# Patient Record
Sex: FEMALE | Race: WHITE | NOT HISPANIC OR LATINO | Employment: FULL TIME | URBAN - METROPOLITAN AREA
[De-identification: names, ages, dates, MRNs, and addresses within clinical notes are randomized per-mention and may not be internally consistent; named-entity substitution may affect disease eponyms.]

---

## 2015-06-16 LAB — CRC RECOMMENDATION EXT: NORMAL

## 2020-07-08 ENCOUNTER — HISTORICAL (OUTPATIENT)
Dept: RADIOLOGY | Facility: HOSPITAL | Age: 60
End: 2020-07-08

## 2020-08-11 ENCOUNTER — HISTORICAL (OUTPATIENT)
Dept: LAB | Facility: HOSPITAL | Age: 60
End: 2020-08-11

## 2020-08-11 LAB
ABS NEUT (OLG): 4 X10(3)/MCL (ref 2.1–9.2)
ALBUMIN SERPL-MCNC: 4 GM/DL (ref 3.4–5)
ALBUMIN/GLOB SERPL: 1.1 RATIO (ref 1.1–2)
ALP SERPL-CCNC: 30 UNIT/L (ref 46–116)
ALT SERPL-CCNC: 24 UNIT/L (ref 12–78)
AST SERPL-CCNC: 23 UNIT/L (ref 15–37)
BASOPHILS # BLD AUTO: 0 X10(3)/MCL (ref 0–0.2)
BASOPHILS NFR BLD AUTO: 1 %
BILIRUB SERPL-MCNC: 0.4 MG/DL (ref 0.2–1)
BILIRUBIN DIRECT+TOT PNL SERPL-MCNC: 0.08 MG/DL (ref 0–0.2)
BILIRUBIN DIRECT+TOT PNL SERPL-MCNC: 0.32 MG/DL (ref 0–0.8)
BUN SERPL-MCNC: 25.6 MG/DL (ref 7–18)
CALCIUM SERPL-MCNC: 9.6 MG/DL (ref 8.5–10.1)
CHLORIDE SERPL-SCNC: 107 MMOL/L (ref 98–107)
CHOLEST SERPL-MCNC: 178 MG/DL (ref 0–200)
CHOLEST/HDLC SERPL: 4.1 {RATIO} (ref 0–4)
CO2 SERPL-SCNC: 31.4 MMOL/L (ref 21–32)
CREAT SERPL-MCNC: 0.86 MG/DL (ref 0.6–1.3)
EOSINOPHIL # BLD AUTO: 0.3 X10(3)/MCL (ref 0–0.9)
EOSINOPHIL NFR BLD AUTO: 4 %
ERYTHROCYTE [DISTWIDTH] IN BLOOD BY AUTOMATED COUNT: 13.4 % (ref 11.5–17)
EST. AVERAGE GLUCOSE BLD GHB EST-MCNC: 103 MG/DL
GLOBULIN SER-MCNC: 3.8 GM/DL (ref 2.4–3.5)
GLUCOSE SERPL-MCNC: 87 MG/DL (ref 74–106)
HBA1C MFR BLD: 5.2 % (ref 4.5–6.2)
HCT VFR BLD AUTO: 43.2 % (ref 37–47)
HDLC SERPL-MCNC: 43 MG/DL (ref 40–60)
HGB BLD-MCNC: 14 GM/DL (ref 12–16)
IMM GRANULOCYTES # BLD AUTO: 0.01 % (ref 0–0.02)
IMM GRANULOCYTES NFR BLD AUTO: 0.2 % (ref 0–0.43)
LDLC SERPL CALC-MCNC: 124 MG/DL (ref 0–129)
LYMPHOCYTES # BLD AUTO: 1.5 X10(3)/MCL (ref 0.6–4.6)
LYMPHOCYTES NFR BLD AUTO: 24 %
MCH RBC QN AUTO: 30.4 PG (ref 27–31)
MCHC RBC AUTO-ENTMCNC: 32.4 GM/DL (ref 33–36)
MCV RBC AUTO: 93.9 FL (ref 80–94)
MONOCYTES # BLD AUTO: 0.6 X10(3)/MCL (ref 0.1–1.3)
MONOCYTES NFR BLD AUTO: 10 %
NEUTROPHILS # BLD AUTO: 4 X10(3)/MCL (ref 1.4–7.9)
NEUTROPHILS NFR BLD AUTO: 62 %
PLATELET # BLD AUTO: 229 X10(3)/MCL (ref 130–400)
PMV BLD AUTO: 11.8 FL (ref 9.4–12.4)
POTASSIUM SERPL-SCNC: 4.7 MMOL/L (ref 3.5–5.1)
PROT SERPL-MCNC: 7.8 GM/DL (ref 6.4–8.2)
RBC # BLD AUTO: 4.6 X10(6)/MCL (ref 4.2–5.4)
SODIUM SERPL-SCNC: 145 MMOL/L (ref 136–145)
TRIGL SERPL-MCNC: 54 MG/DL
TSH SERPL-ACNC: 1.61 MIU/ML (ref 0.36–3.74)
VLDLC SERPL CALC-MCNC: 11 MG/DL
WBC # SPEC AUTO: 6.5 X10(3)/MCL (ref 4.5–11.5)

## 2021-01-18 LAB — CRC RECOMMENDATION EXT: NORMAL

## 2021-01-22 LAB
BCS RECOMMENDATION EXT: NORMAL
BCS RECOMMENDATION EXT: NORMAL

## 2021-03-17 ENCOUNTER — HISTORICAL (OUTPATIENT)
Dept: LAB | Facility: HOSPITAL | Age: 61
End: 2021-03-17

## 2021-03-17 LAB
ABS NEUT (OLG): 2.18 X10(3)/MCL (ref 2.1–9.2)
ALBUMIN SERPL-MCNC: 3.8 GM/DL (ref 3.4–4.8)
ALBUMIN/GLOB SERPL: 1.5 RATIO (ref 1.1–2)
ALP SERPL-CCNC: 35 UNIT/L (ref 40–150)
ALT SERPL-CCNC: 15 UNIT/L (ref 0–55)
AST SERPL-CCNC: 22 UNIT/L (ref 5–34)
BASOPHILS # BLD AUTO: 0 X10(3)/MCL (ref 0–0.2)
BASOPHILS NFR BLD AUTO: 1 %
BILIRUB SERPL-MCNC: 0.6 MG/DL
BILIRUBIN DIRECT+TOT PNL SERPL-MCNC: 0.2 MG/DL (ref 0–0.5)
BILIRUBIN DIRECT+TOT PNL SERPL-MCNC: 0.4 MG/DL (ref 0–0.8)
BUN SERPL-MCNC: 19.9 MG/DL (ref 9.8–20.1)
CALCIUM SERPL-MCNC: 8.7 MG/DL (ref 8.4–10.2)
CHLORIDE SERPL-SCNC: 105 MMOL/L (ref 98–107)
CHOLEST SERPL-MCNC: 165 MG/DL
CHOLEST/HDLC SERPL: 4 {RATIO} (ref 0–5)
CO2 SERPL-SCNC: 27 MMOL/L (ref 23–31)
CREAT SERPL-MCNC: 0.72 MG/DL (ref 0.55–1.02)
DEPRECATED CALCIDIOL+CALCIFEROL SERPL-MC: 59.8 NG/ML (ref 30–80)
EOSINOPHIL # BLD AUTO: 0.3 X10(3)/MCL (ref 0–0.9)
EOSINOPHIL NFR BLD AUTO: 8 %
ERYTHROCYTE [DISTWIDTH] IN BLOOD BY AUTOMATED COUNT: 13.1 % (ref 11.5–17)
GLOBULIN SER-MCNC: 2.6 GM/DL (ref 2.4–3.5)
GLUCOSE SERPL-MCNC: 77 MG/DL (ref 82–115)
HCT VFR BLD AUTO: 45.1 % (ref 37–47)
HDLC SERPL-MCNC: 38 MG/DL (ref 35–60)
HGB BLD-MCNC: 13.7 GM/DL (ref 12–16)
LDLC SERPL CALC-MCNC: 113 MG/DL (ref 50–140)
LYMPHOCYTES # BLD AUTO: 1.4 X10(3)/MCL (ref 0.6–4.6)
LYMPHOCYTES NFR BLD AUTO: 32 %
MCH RBC QN AUTO: 30.2 PG (ref 27–31)
MCHC RBC AUTO-ENTMCNC: 30.4 GM/DL (ref 33–36)
MCV RBC AUTO: 99.3 FL (ref 80–94)
MONOCYTES # BLD AUTO: 0.4 X10(3)/MCL (ref 0.1–1.3)
MONOCYTES NFR BLD AUTO: 10 %
NEUTROPHILS # BLD AUTO: 2.18 X10(3)/MCL (ref 1.4–7.9)
NEUTROPHILS NFR BLD AUTO: 50 %
PLATELET # BLD AUTO: 227 X10(3)/MCL (ref 130–400)
PMV BLD AUTO: 11.3 FL (ref 9.4–12.4)
POTASSIUM SERPL-SCNC: 4.7 MMOL/L (ref 3.5–5.1)
PROT SERPL-MCNC: 6.4 GM/DL (ref 5.8–7.6)
RBC # BLD AUTO: 4.54 X10(6)/MCL (ref 4.2–5.4)
SODIUM SERPL-SCNC: 141 MMOL/L (ref 136–145)
TRIGL SERPL-MCNC: 69 MG/DL (ref 37–140)
VLDLC SERPL CALC-MCNC: 14 MG/DL
WBC # SPEC AUTO: 4.4 X10(3)/MCL (ref 4.5–11.5)

## 2021-09-16 ENCOUNTER — HISTORICAL (OUTPATIENT)
Dept: LAB | Facility: HOSPITAL | Age: 61
End: 2021-09-16

## 2022-04-07 ENCOUNTER — HISTORICAL (OUTPATIENT)
Dept: ADMINISTRATIVE | Facility: HOSPITAL | Age: 62
End: 2022-04-07

## 2022-04-12 LAB
PAP RECOMMENDATION EXT: NORMAL
PAP SMEAR: NORMAL

## 2022-04-23 VITALS
WEIGHT: 135.56 LBS | SYSTOLIC BLOOD PRESSURE: 132 MMHG | DIASTOLIC BLOOD PRESSURE: 86 MMHG | OXYGEN SATURATION: 98 % | HEIGHT: 66 IN | BODY MASS INDEX: 21.79 KG/M2

## 2022-04-29 LAB — BCS RECOMMENDATION EXT: NORMAL

## 2022-06-03 ENCOUNTER — LAB VISIT (OUTPATIENT)
Dept: LAB | Facility: HOSPITAL | Age: 62
End: 2022-06-03
Attending: INTERNAL MEDICINE
Payer: COMMERCIAL

## 2022-06-03 DIAGNOSIS — M81.0 SENILE OSTEOPOROSIS: ICD-10-CM

## 2022-06-03 DIAGNOSIS — Z79.899 ENCOUNTER FOR LONG-TERM (CURRENT) USE OF OTHER MEDICATIONS: Primary | ICD-10-CM

## 2022-06-03 LAB
ALBUMIN SERPL-MCNC: 4 GM/DL (ref 3.4–4.8)
ALBUMIN/GLOB SERPL: 1.3 RATIO (ref 1.1–2)
ALP SERPL-CCNC: 33 UNIT/L (ref 40–150)
ALT SERPL-CCNC: 18 UNIT/L (ref 0–55)
AST SERPL-CCNC: 25 UNIT/L (ref 5–34)
BASOPHILS # BLD AUTO: 0.05 X10(3)/MCL (ref 0–0.2)
BASOPHILS NFR BLD AUTO: 1.2 %
BILIRUBIN DIRECT+TOT PNL SERPL-MCNC: 0.6 MG/DL
BUN SERPL-MCNC: 20.2 MG/DL (ref 9.8–20.1)
CALCIUM SERPL-MCNC: 9.7 MG/DL (ref 8.4–10.2)
CHLORIDE SERPL-SCNC: 104 MMOL/L (ref 98–107)
CO2 SERPL-SCNC: 31 MMOL/L (ref 23–31)
CREAT SERPL-MCNC: 0.79 MG/DL (ref 0.55–1.02)
EOSINOPHIL # BLD AUTO: 0.16 X10(3)/MCL (ref 0–0.9)
EOSINOPHIL NFR BLD AUTO: 3.7 %
ERYTHROCYTE [DISTWIDTH] IN BLOOD BY AUTOMATED COUNT: 13.7 % (ref 11.5–17)
GLOBULIN SER-MCNC: 3.2 GM/DL (ref 2.4–3.5)
GLUCOSE SERPL-MCNC: 91 MG/DL (ref 82–115)
HCT VFR BLD AUTO: 42.9 % (ref 37–47)
HGB BLD-MCNC: 13.8 GM/DL (ref 12–16)
IMM GRANULOCYTES # BLD AUTO: 0 X10(3)/MCL (ref 0–0.02)
IMM GRANULOCYTES NFR BLD AUTO: 0 % (ref 0–0.43)
LYMPHOCYTES # BLD AUTO: 1.14 X10(3)/MCL (ref 0.6–4.6)
LYMPHOCYTES NFR BLD AUTO: 26.5 %
MCH RBC QN AUTO: 30.2 PG (ref 27–31)
MCHC RBC AUTO-ENTMCNC: 32.2 MG/DL (ref 33–36)
MCV RBC AUTO: 93.9 FL (ref 80–94)
MONOCYTES # BLD AUTO: 0.47 X10(3)/MCL (ref 0.1–1.3)
MONOCYTES NFR BLD AUTO: 10.9 %
NEUTROPHILS # BLD AUTO: 2.5 X10(3)/MCL (ref 2.1–9.2)
NEUTROPHILS NFR BLD AUTO: 57.7 %
PLATELET # BLD AUTO: 236 X10(3)/MCL (ref 130–400)
PMV BLD AUTO: 10.4 FL (ref 9.4–12.4)
POTASSIUM SERPL-SCNC: 4.5 MMOL/L (ref 3.5–5.1)
PROT SERPL-MCNC: 7.2 GM/DL (ref 5.8–7.6)
RBC # BLD AUTO: 4.57 X10(6)/MCL (ref 4.2–5.4)
SODIUM SERPL-SCNC: 142 MMOL/L (ref 136–145)
WBC # SPEC AUTO: 4.3 X10(3)/MCL (ref 4.5–11.5)

## 2022-06-03 PROCEDURE — 80053 COMPREHEN METABOLIC PANEL: CPT

## 2022-06-03 PROCEDURE — 36415 COLL VENOUS BLD VENIPUNCTURE: CPT

## 2022-06-03 PROCEDURE — 85025 COMPLETE CBC W/AUTO DIFF WBC: CPT

## 2022-09-20 ENCOUNTER — TELEPHONE (OUTPATIENT)
Dept: FAMILY MEDICINE | Facility: CLINIC | Age: 62
End: 2022-09-20
Payer: COMMERCIAL

## 2022-09-20 ENCOUNTER — LAB VISIT (OUTPATIENT)
Dept: LAB | Facility: HOSPITAL | Age: 62
End: 2022-09-20
Attending: NURSE PRACTITIONER
Payer: COMMERCIAL

## 2022-09-20 DIAGNOSIS — Z00.00 ROUTINE GENERAL MEDICAL EXAMINATION AT A HEALTH CARE FACILITY: ICD-10-CM

## 2022-09-20 DIAGNOSIS — Z11.59 NEED FOR HEPATITIS C SCREENING TEST: ICD-10-CM

## 2022-09-20 DIAGNOSIS — Z00.00 ANNUAL PHYSICAL EXAM: ICD-10-CM

## 2022-09-20 DIAGNOSIS — Z00.00 ANNUAL PHYSICAL EXAM: Primary | ICD-10-CM

## 2022-09-20 DIAGNOSIS — M81.0 SENILE OSTEOPOROSIS: Primary | ICD-10-CM

## 2022-09-20 DIAGNOSIS — Z11.4 SCREENING FOR HIV (HUMAN IMMUNODEFICIENCY VIRUS): ICD-10-CM

## 2022-09-20 LAB
ALBUMIN SERPL-MCNC: 4 GM/DL (ref 3.4–4.8)
ALBUMIN/GLOB SERPL: 1.1 RATIO (ref 1.1–2)
ALP SERPL-CCNC: 34 UNIT/L (ref 40–150)
ALT SERPL-CCNC: 18 UNIT/L (ref 0–55)
AST SERPL-CCNC: 26 UNIT/L (ref 5–34)
BASOPHILS # BLD AUTO: 0.03 X10(3)/MCL (ref 0–0.2)
BASOPHILS NFR BLD AUTO: 0.7 %
BILIRUBIN DIRECT+TOT PNL SERPL-MCNC: 0.5 MG/DL
BUN SERPL-MCNC: 19.8 MG/DL (ref 9.8–20.1)
CALCIUM SERPL-MCNC: 9.5 MG/DL (ref 8.4–10.2)
CHLORIDE SERPL-SCNC: 102 MMOL/L (ref 98–107)
CHOLEST SERPL-MCNC: 163 MG/DL
CHOLEST/HDLC SERPL: 4 {RATIO} (ref 0–5)
CO2 SERPL-SCNC: 29 MMOL/L (ref 23–31)
CREAT SERPL-MCNC: 0.77 MG/DL (ref 0.55–1.02)
DEPRECATED CALCIDIOL+CALCIFEROL SERPL-MC: 53.4 NG/ML (ref 30–80)
EOSINOPHIL # BLD AUTO: 0.25 X10(3)/MCL (ref 0–0.9)
EOSINOPHIL NFR BLD AUTO: 5.8 %
ERYTHROCYTE [DISTWIDTH] IN BLOOD BY AUTOMATED COUNT: 13.5 % (ref 11.5–17)
GFR SERPLBLD CREATININE-BSD FMLA CKD-EPI: >60 MLS/MIN/1.73/M2
GLOBULIN SER-MCNC: 3.5 GM/DL (ref 2.4–3.5)
GLUCOSE SERPL-MCNC: 97 MG/DL (ref 82–115)
HCT VFR BLD AUTO: 45.8 % (ref 37–47)
HCV AB SERPL QL IA: NONREACTIVE
HDLC SERPL-MCNC: 40 MG/DL (ref 35–60)
HGB BLD-MCNC: 14.2 GM/DL (ref 12–16)
HIV 1+2 AB+HIV1 P24 AG SERPL QL IA: NONREACTIVE
IMM GRANULOCYTES # BLD AUTO: 0.01 X10(3)/MCL (ref 0–0.04)
IMM GRANULOCYTES NFR BLD AUTO: 0.2 %
LDLC SERPL CALC-MCNC: 109 MG/DL (ref 50–140)
LYMPHOCYTES # BLD AUTO: 1.28 X10(3)/MCL (ref 0.6–4.6)
LYMPHOCYTES NFR BLD AUTO: 29.8 %
MCH RBC QN AUTO: 29.2 PG (ref 27–31)
MCHC RBC AUTO-ENTMCNC: 31 MG/DL (ref 33–36)
MCV RBC AUTO: 94 FL (ref 80–94)
MONOCYTES # BLD AUTO: 0.42 X10(3)/MCL (ref 0.1–1.3)
MONOCYTES NFR BLD AUTO: 9.8 %
NEUTROPHILS # BLD AUTO: 2.3 X10(3)/MCL (ref 2.1–9.2)
NEUTROPHILS NFR BLD AUTO: 53.7 %
PLATELET # BLD AUTO: 216 X10(3)/MCL (ref 130–400)
PMV BLD AUTO: 10.1 FL (ref 7.4–10.4)
POTASSIUM SERPL-SCNC: 4.5 MMOL/L (ref 3.5–5.1)
PROT SERPL-MCNC: 7.5 GM/DL (ref 5.8–7.6)
RBC # BLD AUTO: 4.87 X10(6)/MCL (ref 4.2–5.4)
SODIUM SERPL-SCNC: 140 MMOL/L (ref 136–145)
TRIGL SERPL-MCNC: 71 MG/DL (ref 37–140)
TSH SERPL-ACNC: 1.95 UIU/ML (ref 0.35–4.94)
VLDLC SERPL CALC-MCNC: 14 MG/DL
WBC # SPEC AUTO: 4.3 X10(3)/MCL (ref 4.5–11.5)

## 2022-09-20 PROCEDURE — 87389 HIV-1 AG W/HIV-1&-2 AB AG IA: CPT

## 2022-09-20 PROCEDURE — 84443 ASSAY THYROID STIM HORMONE: CPT

## 2022-09-20 PROCEDURE — 85025 COMPLETE CBC W/AUTO DIFF WBC: CPT

## 2022-09-20 PROCEDURE — 80061 LIPID PANEL: CPT

## 2022-09-20 PROCEDURE — 36415 COLL VENOUS BLD VENIPUNCTURE: CPT

## 2022-09-20 PROCEDURE — 86803 HEPATITIS C AB TEST: CPT

## 2022-09-20 PROCEDURE — 82306 VITAMIN D 25 HYDROXY: CPT

## 2022-09-20 PROCEDURE — 80053 COMPREHEN METABOLIC PANEL: CPT

## 2022-09-20 NOTE — TELEPHONE ENCOUNTER
The lab most of used orders from the old system, for some reason they did not run the lipid panel.  Also, I added hep C and HIV screening.

## 2022-09-20 NOTE — TELEPHONE ENCOUNTER
Are there any outstanding tasks in patient's chart?  n    2. Do we have outstanding/pending referrals?  n    3. Has the patient been seen in an ER, Urgent Care, or admitted since last visit?  n    4. Has patient seen any other health care providers since last visit?  n    5.  Has patient had any blood work or x-rays done since last visit?   Completed labs at [facility] on 9/20/22

## 2022-09-27 ENCOUNTER — DOCUMENTATION ONLY (OUTPATIENT)
Dept: FAMILY MEDICINE | Facility: CLINIC | Age: 62
End: 2022-09-27

## 2022-09-27 ENCOUNTER — OFFICE VISIT (OUTPATIENT)
Dept: FAMILY MEDICINE | Facility: CLINIC | Age: 62
End: 2022-09-27
Payer: COMMERCIAL

## 2022-09-27 VITALS
RESPIRATION RATE: 16 BRPM | HEIGHT: 66 IN | HEART RATE: 68 BPM | WEIGHT: 132.19 LBS | SYSTOLIC BLOOD PRESSURE: 110 MMHG | BODY MASS INDEX: 21.24 KG/M2 | OXYGEN SATURATION: 98 % | TEMPERATURE: 98 F | DIASTOLIC BLOOD PRESSURE: 78 MMHG

## 2022-09-27 DIAGNOSIS — M81.0 OSTEOPOROSIS, UNSPECIFIED OSTEOPOROSIS TYPE, UNSPECIFIED PATHOLOGICAL FRACTURE PRESENCE: ICD-10-CM

## 2022-09-27 DIAGNOSIS — Z11.4 SCREENING FOR HIV (HUMAN IMMUNODEFICIENCY VIRUS): ICD-10-CM

## 2022-09-27 DIAGNOSIS — Z11.59 NEED FOR HEPATITIS C SCREENING TEST: ICD-10-CM

## 2022-09-27 DIAGNOSIS — M50.10 CERVICAL RADICULOPATHY DUE TO INTERVERTEBRAL DISC DISORDER: ICD-10-CM

## 2022-09-27 DIAGNOSIS — Z12.31 BREAST CANCER SCREENING BY MAMMOGRAM: ICD-10-CM

## 2022-09-27 DIAGNOSIS — G56.01 CARPAL TUNNEL SYNDROME OF RIGHT WRIST: ICD-10-CM

## 2022-09-27 DIAGNOSIS — F32.A ANXIETY AND DEPRESSION: ICD-10-CM

## 2022-09-27 DIAGNOSIS — B35.1 ONYCHOMYCOSIS: ICD-10-CM

## 2022-09-27 DIAGNOSIS — I10 HYPERTENSION, UNSPECIFIED TYPE: ICD-10-CM

## 2022-09-27 DIAGNOSIS — E78.5 HYPERLIPIDEMIA, UNSPECIFIED HYPERLIPIDEMIA TYPE: ICD-10-CM

## 2022-09-27 DIAGNOSIS — F41.9 ANXIETY AND DEPRESSION: ICD-10-CM

## 2022-09-27 DIAGNOSIS — M79.672 PAIN OF LEFT HEEL: ICD-10-CM

## 2022-09-27 DIAGNOSIS — Z00.00 ANNUAL PHYSICAL EXAM: Primary | ICD-10-CM

## 2022-09-27 PROBLEM — M50.90 CERVICAL DISC DISEASE: Status: ACTIVE | Noted: 2022-09-27

## 2022-09-27 PROBLEM — M50.90 CERVICAL DISC DISEASE: Status: RESOLVED | Noted: 2022-09-27 | Resolved: 2022-09-27

## 2022-09-27 PROBLEM — Z87.410 HISTORY OF CERVICAL DYSPLASIA: Status: ACTIVE | Noted: 2022-09-27

## 2022-09-27 PROBLEM — N95.2 ATROPHIC VAGINITIS: Status: ACTIVE | Noted: 2022-09-27

## 2022-09-27 PROBLEM — R42 VERTIGO: Status: ACTIVE | Noted: 2022-09-27

## 2022-09-27 PROBLEM — K63.5 POLYP OF COLON: Status: ACTIVE | Noted: 2022-09-27

## 2022-09-27 PROCEDURE — 3008F BODY MASS INDEX DOCD: CPT | Mod: CPTII,,, | Performed by: NURSE PRACTITIONER

## 2022-09-27 PROCEDURE — 96372 THER/PROPH/DIAG INJ SC/IM: CPT | Mod: ,,, | Performed by: NURSE PRACTITIONER

## 2022-09-27 PROCEDURE — 3078F PR MOST RECENT DIASTOLIC BLOOD PRESSURE < 80 MM HG: ICD-10-PCS | Mod: CPTII,,, | Performed by: NURSE PRACTITIONER

## 2022-09-27 PROCEDURE — 3074F PR MOST RECENT SYSTOLIC BLOOD PRESSURE < 130 MM HG: ICD-10-PCS | Mod: CPTII,,, | Performed by: NURSE PRACTITIONER

## 2022-09-27 PROCEDURE — 1160F PR REVIEW ALL MEDS BY PRESCRIBER/CLIN PHARMACIST DOCUMENTED: ICD-10-PCS | Mod: CPTII,,, | Performed by: NURSE PRACTITIONER

## 2022-09-27 PROCEDURE — 3008F PR BODY MASS INDEX (BMI) DOCUMENTED: ICD-10-PCS | Mod: CPTII,,, | Performed by: NURSE PRACTITIONER

## 2022-09-27 PROCEDURE — 99396 PREV VISIT EST AGE 40-64: CPT | Mod: 25,,, | Performed by: NURSE PRACTITIONER

## 2022-09-27 PROCEDURE — 99396 PR PREVENTIVE VISIT,EST,40-64: ICD-10-PCS | Mod: 25,,, | Performed by: NURSE PRACTITIONER

## 2022-09-27 PROCEDURE — 3078F DIAST BP <80 MM HG: CPT | Mod: CPTII,,, | Performed by: NURSE PRACTITIONER

## 2022-09-27 PROCEDURE — 3074F SYST BP LT 130 MM HG: CPT | Mod: CPTII,,, | Performed by: NURSE PRACTITIONER

## 2022-09-27 PROCEDURE — 96372 PR INJECTION,THERAP/PROPH/DIAG2ST, IM OR SUBCUT: ICD-10-PCS | Mod: ,,, | Performed by: NURSE PRACTITIONER

## 2022-09-27 PROCEDURE — 1159F MED LIST DOCD IN RCRD: CPT | Mod: CPTII,,, | Performed by: NURSE PRACTITIONER

## 2022-09-27 PROCEDURE — 1159F PR MEDICATION LIST DOCUMENTED IN MEDICAL RECORD: ICD-10-PCS | Mod: CPTII,,, | Performed by: NURSE PRACTITIONER

## 2022-09-27 PROCEDURE — 1160F RVW MEDS BY RX/DR IN RCRD: CPT | Mod: CPTII,,, | Performed by: NURSE PRACTITIONER

## 2022-09-27 RX ORDER — PRAVASTATIN SODIUM 40 MG/1
40 TABLET ORAL DAILY
COMMUNITY
Start: 2022-09-05 | End: 2022-12-02

## 2022-09-27 RX ORDER — MONTELUKAST SODIUM 10 MG/1
TABLET ORAL
COMMUNITY
Start: 2022-07-11 | End: 2022-10-17

## 2022-09-27 RX ORDER — CLINDAMYCIN PHOSPHATE 100 MG/5G
CREAM VAGINAL
COMMUNITY
Start: 2022-04-21 | End: 2023-04-13 | Stop reason: ALTCHOICE

## 2022-09-27 RX ORDER — FLUTICASONE PROPIONATE 50 MCG
2 SPRAY, SUSPENSION (ML) NASAL DAILY
COMMUNITY
Start: 2022-09-04 | End: 2022-12-05

## 2022-09-27 RX ORDER — ONDANSETRON 4 MG/1
TABLET, FILM COATED ORAL
COMMUNITY

## 2022-09-27 RX ORDER — ESTRADIOL 0.1 MG/G
CREAM VAGINAL
COMMUNITY
Start: 2022-07-11 | End: 2024-01-02

## 2022-09-27 RX ORDER — ALPRAZOLAM 0.25 MG/1
0.25 TABLET ORAL DAILY PRN
Qty: 30 TABLET | Refills: 2 | Status: SHIPPED | OUTPATIENT
Start: 2022-09-27 | End: 2023-10-24 | Stop reason: SDUPTHER

## 2022-09-27 RX ORDER — ESTRADIOL 10 UG/1
INSERT VAGINAL
COMMUNITY
Start: 2022-07-18 | End: 2024-01-02

## 2022-09-27 RX ORDER — HYDROCHLOROTHIAZIDE 12.5 MG/1
12.5 CAPSULE ORAL DAILY
COMMUNITY
Start: 2022-09-04 | End: 2022-12-02

## 2022-09-27 RX ORDER — DEXAMETHASONE SODIUM PHOSPHATE 100 MG/10ML
10 INJECTION INTRAMUSCULAR; INTRAVENOUS
Status: COMPLETED | OUTPATIENT
Start: 2022-09-27 | End: 2022-09-27

## 2022-09-27 RX ORDER — ALENDRONATE SODIUM 70 MG/1
TABLET ORAL
COMMUNITY
Start: 2022-09-23

## 2022-09-27 RX ADMIN — DEXAMETHASONE SODIUM PHOSPHATE 10 MG: 100 INJECTION INTRAMUSCULAR; INTRAVENOUS at 10:09

## 2022-09-27 NOTE — LETTER
AUTHORIZATION FOR RELEASE OF   CONFIDENTIAL INFORMATION    Dear [facility],    We are seeing Crystal Chaney, date of birth 1960, in the clinic at [facility]. RENETTA Estevez is the patient's PCP. Crystal Chaney has an outstanding lab/procedure at the time we reviewed her chart. In order to help keep her health information updated, she has authorized us to request the following medical record(s):        (  )  MAMMOGRAM                                      (X)  COLONOSCOPY      (  )  PAP SMEAR                                          (  )  OUTSIDE LAB RESULTS     (  )  DEXA SCAN                                          (  )  EYE EXAM            (  )  FOOT EXAM                                          (  )  ENTIRE RECORD     (  )  OUTSIDE IMMUNIZATIONS                 (  )  _______________         Please fax records to [facility], Kathryn F Duplantis, FNP, [phone]     If you have any questions, please contact us at [phone].        Patient Name: Crystal Chaney  : 1960  Patient Phone #: [phone]

## 2022-09-27 NOTE — PROGRESS NOTES
Subjective:       Patient ID: Crystal Chaney is a 61 y.o. female.    Chief Complaint: Annual Exam      HPI   This is a 61-year-old white female who presents to clinic today for an annual wellness exam.  Patient has a history of vertigo, cervical dysplasia, hyperlipidemia, hypertension, osteoarthritis, osteoporosis, colon polyps, cervical disc disease.  Patient states that overall she is doing okay.  Does complain of some pain in her right wrist and some numbness in her right 1st 3 fingers.  This has been going on now for couple of months.  Also complaining of pain in her neck.  The pain in her neck radiates down her right arm and will sometimes make her right 4th and 5th fingers numb, they are not numb at the same time as her other fingers.  Does report a history of cervical disc disease and injections in her neck while living in [place] a few years ago.  Also concerned about her weight, states she has gained a couple of lb this year.  She is exercising regularly and eating a well-balanced diet.  Also having a little bit of anxiety and maybe some depression.  States that she is taking on a lot at work right now and he had makes her feel a little bit overwhelmed.  In the past she was treated for anxiety and depression during her divorce but only was on medication for about a year and a half.  Does not feel as bad as she felt then, just a little overwhelmed and anxious.  Denies any suicidal or homicidal ideations.  Also having some pain in her left heel, mainly when she gets out of bed in the morning.  Describes it as a pain right in the center of her heel.  Denies any injury.  Patient also has a complaint of thick discolored right thumb nail as well as bilateral 2nd toe nails.  Has been using over-the-counter antifungal with no relief.    Review of Systems  Comprehensive review of systems negative except as stated in HPI    The patient's Health Maintenance was reviewed and the following appears to be due:    Health Maintenance Due   Topic Date Due    TETANUS VACCINE  Never done    COVID-19 Vaccine (4 - Booster for Ankit series) 2022    Influenza Vaccine (1) 2022       Past Medical History:  Past Medical History:   Diagnosis Date    Benign paroxysmal vertigo, bilateral     History of cervical dysplasia     Hyperlipidemia     Hypertensive disorder     Osteoarthritis of left knee     Osteoporosis     Personal history of colonic polyps      Past Surgical History:   Procedure Laterality Date    BUNIONECTOMY      HYSTERECTOMY      JOINT REPLACEMENT      LAPAROSCOPIC TOTAL HYSTERECTOMY  2005    TONSILLECTOMY  1965    TONSILLECTOMY AND ADENOIDECTOMY      TOTAL KNEE ARTHROPLASTY Left 2020    Dr. Quintin Monreal     Review of patient's allergies indicates:   Allergen Reactions    Codeine Nausea Only and Palpitations     Other reaction(s): Headache     Current Outpatient Medications on File Prior to Visit   Medication Sig Dispense Refill    alendronate (FOSAMAX) 70 MG tablet PLEASE SEE ATTACHED FOR DETAILED DIRECTIONS      calcium carbonate/vitamin D3 (CALCIUM WITH VITAMIN D ORAL) Calcium with Vitamin D   1x daily      CLINDESSE vaginal cream INSERT 1 APPLICATORFUL BY VAGINAL ROUT ONCE & THEN REPEAT IN WEEK      docosahexaenoic acid/epa (FISH OIL ORAL) Fish Oil   1x daily      estradioL (ESTRACE) 0.01 % (0.1 mg/gram) vaginal cream SMARTSIG:Gram(s) Topical Twice a Week      fluticasone propionate (FLONASE) 50 mcg/actuation nasal spray 2 sprays by Each Nostril route once daily.      hydroCHLOROthiazide (MICROZIDE) 12.5 mg capsule Take 12.5 mg by mouth once daily.      IMVEXXY MAINTENANCE PACK 10 mcg Inst Place vaginally.      montelukast (SINGULAIR) 10 mg tablet SMARTSI Tablet(s) By Mouth Every Evening      multivit with calcium,iron,min (WOMEN'S DAILY MULTIVITAMIN ORAL) Women's Daily Multivitamin   1x daily      ondansetron (ZOFRAN) 4 MG tablet ondansetron HCl 4 mg tablet      pravastatin (PRAVACHOL) 40 MG  "tablet Take 40 mg by mouth once daily.       No current facility-administered medications on file prior to visit.     Social History     Socioeconomic History    Marital status:    Tobacco Use    Smoking status: Never    Smokeless tobacco: Never   Substance and Sexual Activity    Alcohol use: Yes     Alcohol/week: 1.0 standard drink     Types: 1 Cans of beer per week     Comment: couple drinks/week    Drug use: Never    Sexual activity: Yes     Partners: Male     Birth control/protection: None     Family History   Problem Relation Age of Onset    Arthritis Mother     COPD Mother     Cancer Father     Diabetes Maternal Uncle     Parkinsonism Paternal Uncle     Heart disease Maternal Grandfather     Heart disease Paternal Grandfather        Objective:       /78 (BP Location: Left arm)   Pulse 68   Temp 98.2 °F (36.8 °C) (Oral)   Resp 16   Ht 5' 6" (1.676 m)   Wt 60 kg (132 lb 3.2 oz)   LMP  (LMP Unknown)   SpO2 98%   BMI 21.34 kg/m²      Physical Exam  Vitals and nursing note reviewed.   Constitutional:       Appearance: Normal appearance. She is normal weight.   HENT:      Head: Normocephalic and atraumatic.      Right Ear: Tympanic membrane, ear canal and external ear normal.      Left Ear: Tympanic membrane, ear canal and external ear normal.      Nose: Nose normal.      Mouth/Throat:      Mouth: Mucous membranes are moist.      Pharynx: Oropharynx is clear.   Eyes:      Extraocular Movements: Extraocular movements intact.      Conjunctiva/sclera: Conjunctivae normal.      Pupils: Pupils are equal, round, and reactive to light.   Cardiovascular:      Rate and Rhythm: Normal rate and regular rhythm.      Heart sounds: Normal heart sounds.   Pulmonary:      Effort: Pulmonary effort is normal.      Breath sounds: Normal breath sounds.   Abdominal:      General: Abdomen is flat. Bowel sounds are normal.      Palpations: Abdomen is soft.   Musculoskeletal:         General: Normal range of motion.    "   Cervical back: Normal range of motion and neck supple.   Feet:      Right foot:      Toenail Condition: Right toenails are abnormally thick. Fungal disease present.     Left foot:      Toenail Condition: Left toenails are abnormally thick. Fungal disease present.  Skin:     General: Skin is warm and dry.   Neurological:      General: No focal deficit present.      Mental Status: She is alert and oriented to person, place, and time.   Psychiatric:         Mood and Affect: Mood normal.         Behavior: Behavior normal.         Thought Content: Thought content normal.         Judgment: Judgment normal.       Labs  Documentation Only on 09/27/2022   Component Date Value Ref Range Status    CRC Recommendation External 01/18/2021 Repeat colonoscopy in 7 years   Final   Lab Visit on 09/20/2022   Component Date Value Ref Range Status    Vit D 25 OH 09/20/2022 53.4  30.0 - 80.0 ng/mL Final    Sodium Level 09/20/2022 140  136 - 145 mmol/L Final    Potassium Level 09/20/2022 4.5  3.5 - 5.1 mmol/L Final    Chloride 09/20/2022 102  98 - 107 mmol/L Final    Carbon Dioxide 09/20/2022 29  23 - 31 mmol/L Final    Glucose Level 09/20/2022 97  82 - 115 mg/dL Final    Blood Urea Nitrogen 09/20/2022 19.8  9.8 - 20.1 mg/dL Final    Creatinine 09/20/2022 0.77  0.55 - 1.02 mg/dL Final    Calcium Level Total 09/20/2022 9.5  8.4 - 10.2 mg/dL Final    Protein Total 09/20/2022 7.5  5.8 - 7.6 gm/dL Final    Albumin Level 09/20/2022 4.0  3.4 - 4.8 gm/dL Final    Globulin 09/20/2022 3.5  2.4 - 3.5 gm/dL Final    Albumin/Globulin Ratio 09/20/2022 1.1  1.1 - 2.0 ratio Final    Bilirubin Total 09/20/2022 0.5  <=1.5 mg/dL Final    Alkaline Phosphatase 09/20/2022 34 (L)  40 - 150 unit/L Final    Alanine Aminotransferase 09/20/2022 18  0 - 55 unit/L Final    Aspartate Aminotransferase 09/20/2022 26  5 - 34 unit/L Final    eGFR 09/20/2022 >60  mls/min/1.73/m2 Final    Thyroid Stimulating Hormone 09/20/2022 1.9530  0.3500 - 4.9400 uIU/mL Final    WBC  09/20/2022 4.3 (L)  4.5 - 11.5 x10(3)/mcL Final    RBC 09/20/2022 4.87  4.20 - 5.40 x10(6)/mcL Final    Hgb 09/20/2022 14.2  12.0 - 16.0 gm/dL Final    Hct 09/20/2022 45.8  37.0 - 47.0 % Final    MCV 09/20/2022 94.0  80.0 - 94.0 fL Final    MCH 09/20/2022 29.2  27.0 - 31.0 pg Final    MCHC 09/20/2022 31.0 (L)  33.0 - 36.0 mg/dL Final    RDW 09/20/2022 13.5  11.5 - 17.0 % Final    Platelet 09/20/2022 216  130 - 400 x10(3)/mcL Final    MPV 09/20/2022 10.1  7.4 - 10.4 fL Final    Neut % 09/20/2022 53.7  % Final    Lymph % 09/20/2022 29.8  % Final    Mono % 09/20/2022 9.8  % Final    Eos % 09/20/2022 5.8  % Final    Basophil % 09/20/2022 0.7  % Final    Lymph # 09/20/2022 1.28  0.6 - 4.6 x10(3)/mcL Final    Neut # 09/20/2022 2.3  2.1 - 9.2 x10(3)/mcL Final    Mono # 09/20/2022 0.42  0.1 - 1.3 x10(3)/mcL Final    Eos # 09/20/2022 0.25  0 - 0.9 x10(3)/mcL Final    Baso # 09/20/2022 0.03  0 - 0.2 x10(3)/mcL Final    IG# 09/20/2022 0.01  0 - 0.04 x10(3)/mcL Final    IG% 09/20/2022 0.2  % Final    Hepatitis C Antibody 09/20/2022 Nonreactive  Nonreactive Final    HIV 09/20/2022 Nonreactive  Nonreactive Final    Cholesterol Total 09/20/2022 163  <=200 mg/dL Final    HDL Cholesterol 09/20/2022 40  35 - 60 mg/dL Final    Triglyceride 09/20/2022 71  37 - 140 mg/dL Final    Cholesterol/HDL Ratio 09/20/2022 4  0 - 5 Final    Very Low Density Lipoprotein 09/20/2022 14   Final    LDL Cholesterol 09/20/2022 109.00  50.00 - 140.00 mg/dL Final   Lab Visit on 06/03/2022   Component Date Value Ref Range Status    Sodium Level 06/03/2022 142  136 - 145 mmol/L Final    Potassium Level 06/03/2022 4.5  3.5 - 5.1 mmol/L Final    Chloride 06/03/2022 104  98 - 107 mmol/L Final    Carbon Dioxide 06/03/2022 31  23 - 31 mmol/L Final    Glucose Level 06/03/2022 91  82 - 115 mg/dL Final    Blood Urea Nitrogen 06/03/2022 20.2 (H)  9.8 - 20.1 mg/dL Final    Creatinine 06/03/2022 0.79  0.55 - 1.02 mg/dL Final    Calcium Level Total 06/03/2022 9.7  8.4 -  10.2 mg/dL Final    Protein Total 06/03/2022 7.2  5.8 - 7.6 gm/dL Final    Albumin Level 06/03/2022 4.0  3.4 - 4.8 gm/dL Final    Globulin 06/03/2022 3.2  2.4 - 3.5 gm/dL Final    Albumin/Globulin Ratio 06/03/2022 1.3  1.1 - 2.0 ratio Final    Bilirubin Total 06/03/2022 0.6  <=1.5 mg/dL Final    Alkaline Phosphatase 06/03/2022 33 (L)  40 - 150 unit/L Final    Alanine Aminotransferase 06/03/2022 18  0 - 55 unit/L Final    Aspartate Aminotransferase 06/03/2022 25  5 - 34 unit/L Final    Estimated GFR-Non  06/03/2022 >60  mls/min/1.73/m2 Final    WBC 06/03/2022 4.3 (L)  4.5 - 11.5 x10(3)/mcL Final    RBC 06/03/2022 4.57  4.20 - 5.40 x10(6)/mcL Final    Hgb 06/03/2022 13.8  12.0 - 16.0 gm/dL Final    Hct 06/03/2022 42.9  37.0 - 47.0 % Final    MCV 06/03/2022 93.9  80.0 - 94.0 fL Final    MCH 06/03/2022 30.2  27.0 - 31.0 pg Final    MCHC 06/03/2022 32.2 (L)  33.0 - 36.0 mg/dL Final    RDW 06/03/2022 13.7  11.5 - 17.0 % Final    Platelet 06/03/2022 236  130 - 400 x10(3)/mcL Final    MPV 06/03/2022 10.4  9.4 - 12.4 fL Final    Neut % 06/03/2022 57.7  % Final    Lymph % 06/03/2022 26.5  % Final    Mono % 06/03/2022 10.9  % Final    Eos % 06/03/2022 3.7  % Final    Basophil % 06/03/2022 1.2  % Final    Lymph # 06/03/2022 1.14  0.6 - 4.6 x10(3)/mcL Final    Neut # 06/03/2022 2.5  2.1 - 9.2 x10(3)/mcL Final    Mono # 06/03/2022 0.47  0.1 - 1.3 x10(3)/mcL Final    Eos # 06/03/2022 0.16  0 - 0.9 x10(3)/mcL Final    Baso # 06/03/2022 0.05  0 - 0.2 x10(3)/mcL Final    IG# 06/03/2022 0.00  0 - 0.0155 x10(3)/mcL Final    IG% 06/03/2022 0.0  0 - 0.43 % Final       Assessment and Plan       ICD-10-CM ICD-9-CM   1. Annual physical exam  Z00.00 V70.0   2. Breast cancer screening by mammogram  Z12.31 V76.12   3. Need for hepatitis C screening test  Z11.59 V73.89   4. Screening for HIV (human immunodeficiency virus)  Z11.4 V73.89   5. Pain of left heel  M79.672 729.5   6. Cervical radiculopathy due to intervertebral disc  disorder  M50.10 722.91   7. Carpal tunnel syndrome of right wrist  G56.01 354.0   8. Anxiety and depression  F41.9 300.00    F32.A 311   9. Osteoporosis, unspecified osteoporosis type, unspecified pathological fracture presence  M81.0 733.00   10. Hypertension, unspecified type  I10 401.9   11. Hyperlipidemia, unspecified hyperlipidemia type  E78.5 272.4   12. Onychomycosis  B35.1 110.1        1. Annual physical exam  Overview:  Annual exam yearly in September    Assessment & Plan:  Patient concerned about her weight.  Did notify patient that she is at her ideal weight.  Reassured her.  She is currently monitoring diet and doing 45-75 minutes of exercise per day.  Instructed to continue these healthy habits.      2. Breast cancer screening by mammogram  Overview:  [facility] yearly in April [facility] per Dr Jung GYN      3. Need for hepatitis C screening test  Comments:  Nonreactive    4. Screening for HIV (human immunodeficiency virus)  Comments:  Nonreactive    5. Pain of left heel  Assessment & Plan:  Discussed getting x-ray to check for arthritis/bone spurs.  If negative, discussed starting to wear power step night sock for plantar fasciitis.     Orders:  -     X-Ray Calcaneus 2 View Left    6. Cervical radiculopathy due to intervertebral disc disorder  Overview:  States was seen in [place] by Orthopedics who referred her to Neurosurgery/pain management.  She had injections completed in her neck.  Will attempt to get records.    Assessment & Plan:  Refer to Dr. Nayak    Orders:  -     Ambulatory referral/consult to Neurosurgery  -     dexamethasone injection 10 mg    7. Carpal tunnel syndrome of right wrist  Assessment & Plan:  Instructed to start wearing wrist splint while sleeping and during the day when possible.  Decadron 10 mg IM in clinic today.    Orders:  -     dexamethasone injection 10 mg    8. Anxiety and depression  Overview:  Initially diagnosed in 2000 while going through a divorce. Took  medication for about a year and a half. Took Xanax as needed. Stopped meds after about 1.5 years.     Assessment & Plan:  Trial of low-dose Xanax daily as needed for severe anxiety.  Patient will call and let me know if she feels like she needs to start something for depression.  Instructed to call for any worsening.    Orders:  -     ALPRAZolam (XANAX) 0.25 MG tablet    9. Osteoporosis, unspecified osteoporosis type, unspecified pathological fracture presence  Overview:  Managed by Dr Goldberg  Alendronate 70 mg weekly  DEXA every 2 years, next due March 2023      Assessment & Plan:  Stable, managed by Dr. Goldberg      10. Hypertension, unspecified type  Overview:  HCTZ daily    Assessment & Plan:  Continue to monitor diet, take HCTZ daily.      11. Hyperlipidemia, unspecified hyperlipidemia type  Overview:  Pravastatin 40 mg daily    Assessment & Plan:  Total cholesterol 163, , continue pravastatin 40 mg daily, follow-up 12 months.      12. Onychomycosis  Overview:  09/27/2022 - trial of Penlac    Assessment & Plan:  Trial of Penlac, instructed to use for at least 8 weeks and at least 2 weeks after symptoms resolved.           Follow up in about 1 year (around 9/27/2023) for Annual.     I have personally reviewed RENETTA Sanchez history, exam and medical decision making and agree with the assessment and plan as written. Follow up sooner with the anxiolytic.

## 2022-09-27 NOTE — ASSESSMENT & PLAN NOTE
Patient concerned about her weight.  Did notify patient that she is at her ideal weight.  Reassured her.  She is currently monitoring diet and doing 45-75 minutes of exercise per day.  Instructed to continue these healthy habits.

## 2022-09-27 NOTE — ASSESSMENT & PLAN NOTE
Instructed to start wearing wrist splint while sleeping and during the day when possible.  Decadron 10 mg IM in clinic today.

## 2022-09-27 NOTE — ASSESSMENT & PLAN NOTE
Discussed getting x-ray to check for arthritis/bone spurs.  If negative, discussed starting to wear power step night sock for plantar fasciitis.

## 2022-09-27 NOTE — ASSESSMENT & PLAN NOTE
Trial of low-dose Xanax daily as needed for severe anxiety.  Patient will call and let me know if she feels like she needs to start something for depression.  Instructed to call for any worsening.

## 2022-09-28 ENCOUNTER — TELEPHONE (OUTPATIENT)
Dept: FAMILY MEDICINE | Facility: CLINIC | Age: 62
End: 2022-09-28
Payer: COMMERCIAL

## 2022-09-28 DIAGNOSIS — B35.1 ONYCHOMYCOSIS: Primary | ICD-10-CM

## 2022-09-28 RX ORDER — CICLOPIROX 80 MG/ML
SOLUTION TOPICAL NIGHTLY
Qty: 6.6 ML | Refills: 5 | Status: SHIPPED | OUTPATIENT
Start: 2022-09-28 | End: 2023-10-24

## 2022-09-28 NOTE — ASSESSMENT & PLAN NOTE
Trial of Penlac, instructed to use for at least 8 weeks and at least 2 weeks after symptoms resolved.

## 2022-09-28 NOTE — TELEPHONE ENCOUNTER
----- Message from Batsheva Lr MA sent at 9/28/2022 11:02 AM CDT -----  Regarding: FW: refill    ----- Message -----  From: Eva Clements  Sent: 9/28/2022  10:24 AM CDT  To: Belem FOSTER Staff  Subject: refill                                           Type:  RX Refill Request    Who Called: pt  Refill or New Rx:new  RX Name and Strength:nail fungus treatment  How is the patient currently taking it? (ex. 1XDay):  Is this a 30 day or 90 day RX:  Pref[facility] Pharm[facility] phone number[facility]  Local or Mail Order:local  Ordering Provider:rebecca basilio  Would the patient rather a call back or a response via [facility]? C/b  Best Call Back Number:[phone]  Additional Information: contact pt when script is sent to pharmacy

## 2022-10-05 ENCOUNTER — DOCUMENTATION ONLY (OUTPATIENT)
Dept: ADMINISTRATIVE | Facility: HOSPITAL | Age: 62
End: 2022-10-05
Payer: COMMERCIAL

## 2022-10-12 ENCOUNTER — DOCUMENTATION ONLY (OUTPATIENT)
Dept: INTERNAL MEDICINE | Facility: CLINIC | Age: 62
End: 2022-10-12
Payer: COMMERCIAL

## 2023-01-02 PROBLEM — Z00.00 ANNUAL PHYSICAL EXAM: Status: RESOLVED | Noted: 2022-09-27 | Resolved: 2023-01-02

## 2023-04-06 ENCOUNTER — TELEPHONE (OUTPATIENT)
Dept: FAMILY MEDICINE | Facility: CLINIC | Age: 63
End: 2023-04-06
Payer: COMMERCIAL

## 2023-04-06 NOTE — TELEPHONE ENCOUNTER
Patient is scheduled for a preop clearance next week.  Will you need her to complete labs and EKG prior to appt?

## 2023-04-13 ENCOUNTER — OFFICE VISIT (OUTPATIENT)
Dept: FAMILY MEDICINE | Facility: CLINIC | Age: 63
End: 2023-04-13
Payer: COMMERCIAL

## 2023-04-13 ENCOUNTER — HOSPITAL ENCOUNTER (OUTPATIENT)
Dept: CARDIOLOGY | Facility: HOSPITAL | Age: 63
Discharge: HOME | End: 2023-04-13
Attending: PHYSICIAN ASSISTANT
Payer: COMMERCIAL

## 2023-04-13 ENCOUNTER — HOSPITAL ENCOUNTER (OUTPATIENT)
Dept: RADIOLOGY | Facility: HOSPITAL | Age: 63
Discharge: HOME | End: 2023-04-13
Attending: PHYSICIAN ASSISTANT
Payer: COMMERCIAL

## 2023-04-13 VITALS
OXYGEN SATURATION: 99 % | BODY MASS INDEX: 21.47 KG/M2 | HEIGHT: 66 IN | DIASTOLIC BLOOD PRESSURE: 70 MMHG | TEMPERATURE: 98 F | WEIGHT: 133.63 LBS | RESPIRATION RATE: 16 BRPM | HEART RATE: 56 BPM | SYSTOLIC BLOOD PRESSURE: 106 MMHG

## 2023-04-13 DIAGNOSIS — Z01.818 PRE-OP TESTING: Primary | ICD-10-CM

## 2023-04-13 DIAGNOSIS — Z01.818 PRE-OP TESTING: ICD-10-CM

## 2023-04-13 DIAGNOSIS — Z01.818 PREOPERATIVE CLEARANCE: ICD-10-CM

## 2023-04-13 DIAGNOSIS — M47.812 CERVICAL SPONDYLOSIS WITHOUT MYELOPATHY: ICD-10-CM

## 2023-04-13 DIAGNOSIS — M47.812 CERVICAL SPONDYLOSIS WITHOUT MYELOPATHY: Primary | ICD-10-CM

## 2023-04-13 DIAGNOSIS — M50.10 CERVICAL RADICULOPATHY DUE TO INTERVERTEBRAL DISC DISORDER: Primary | ICD-10-CM

## 2023-04-13 PROCEDURE — 99214 PR OFFICE/OUTPT VISIT, EST, LEVL IV, 30-39 MIN: ICD-10-PCS | Mod: ,,, | Performed by: NURSE PRACTITIONER

## 2023-04-13 PROCEDURE — 3074F SYST BP LT 130 MM HG: CPT | Mod: CPTII,,, | Performed by: NURSE PRACTITIONER

## 2023-04-13 PROCEDURE — 93010 ELECTROCARDIOGRAM REPORT: CPT | Mod: ,,, | Performed by: STUDENT IN AN ORGANIZED HEALTH CARE EDUCATION/TRAINING PROGRAM

## 2023-04-13 PROCEDURE — 1160F RVW MEDS BY RX/DR IN RCRD: CPT | Mod: CPTII,,, | Performed by: NURSE PRACTITIONER

## 2023-04-13 PROCEDURE — 1160F PR REVIEW ALL MEDS BY PRESCRIBER/CLIN PHARMACIST DOCUMENTED: ICD-10-PCS | Mod: CPTII,,, | Performed by: NURSE PRACTITIONER

## 2023-04-13 PROCEDURE — 99214 OFFICE O/P EST MOD 30 MIN: CPT | Mod: ,,, | Performed by: NURSE PRACTITIONER

## 2023-04-13 PROCEDURE — 93005 ELECTROCARDIOGRAM TRACING: CPT

## 2023-04-13 PROCEDURE — 99900031 HC PATIENT EDUCATION (STAT)

## 2023-04-13 PROCEDURE — 3078F DIAST BP <80 MM HG: CPT | Mod: CPTII,,, | Performed by: NURSE PRACTITIONER

## 2023-04-13 PROCEDURE — 1159F PR MEDICATION LIST DOCUMENTED IN MEDICAL RECORD: ICD-10-PCS | Mod: CPTII,,, | Performed by: NURSE PRACTITIONER

## 2023-04-13 PROCEDURE — 3008F BODY MASS INDEX DOCD: CPT | Mod: CPTII,,, | Performed by: NURSE PRACTITIONER

## 2023-04-13 PROCEDURE — 3008F PR BODY MASS INDEX (BMI) DOCUMENTED: ICD-10-PCS | Mod: CPTII,,, | Performed by: NURSE PRACTITIONER

## 2023-04-13 PROCEDURE — 71046 X-RAY EXAM CHEST 2 VIEWS: CPT | Mod: TC

## 2023-04-13 PROCEDURE — 3078F PR MOST RECENT DIASTOLIC BLOOD PRESSURE < 80 MM HG: ICD-10-PCS | Mod: CPTII,,, | Performed by: NURSE PRACTITIONER

## 2023-04-13 PROCEDURE — 1159F MED LIST DOCD IN RCRD: CPT | Mod: CPTII,,, | Performed by: NURSE PRACTITIONER

## 2023-04-13 PROCEDURE — 93041 RHYTHM ECG TRACING: CPT | Mod: 59

## 2023-04-13 PROCEDURE — 93010 EKG 12-LEAD: ICD-10-PCS | Mod: ,,, | Performed by: STUDENT IN AN ORGANIZED HEALTH CARE EDUCATION/TRAINING PROGRAM

## 2023-04-13 PROCEDURE — 3074F PR MOST RECENT SYSTOLIC BLOOD PRESSURE < 130 MM HG: ICD-10-PCS | Mod: CPTII,,, | Performed by: NURSE PRACTITIONER

## 2023-04-13 RX ORDER — LORATADINE 10 MG/1
TABLET ORAL
COMMUNITY

## 2023-04-13 RX ORDER — CHOLECALCIFEROL (VITAMIN D3) 125 MCG
CAPSULE ORAL
COMMUNITY
End: 2023-10-24 | Stop reason: ALTCHOICE

## 2023-04-13 RX ORDER — GABAPENTIN 300 MG/1
300 CAPSULE ORAL NIGHTLY
COMMUNITY
Start: 2023-01-06

## 2023-04-13 NOTE — PROGRESS NOTES
Subjective:       Patient ID: Crystal Chaney is a 62 y.o. female.    Chief Complaint: pre-op Clearance      HPI   This is a 62-year-old white female who presents to clinic today for preoperative clearance for neck surgery.  States that she saw Dr. Nayak and is scheduled to have surgery on May 8th.  States that she is still having daily neck pain and bilateral upper extremity numbness.  She has orders for preoperative labs, chest x-ray, EKG to be completed.  Review of Systems  Comprehensive review of systems negative except as stated in HPI    The patient's Health Maintenance was reviewed and the following appears to be due:   Health Maintenance Due   Topic Date Due    TETANUS VACCINE  Never done    Influenza Vaccine (1) 09/01/2022    Mammogram  04/29/2023       Past Medical History:  Past Medical History:   Diagnosis Date    Benign paroxysmal vertigo, bilateral     History of cervical dysplasia     Hyperlipidemia     Hypertensive disorder     Osteoarthritis of left knee     Osteoporosis     Personal history of colonic polyps      Past Surgical History:   Procedure Laterality Date    BUNIONECTOMY      COLONOSCOPY  06/16/2015    HYSTERECTOMY      JOINT REPLACEMENT      LAPAROSCOPIC TOTAL HYSTERECTOMY  01/01/2005    TONSILLECTOMY  1965    TONSILLECTOMY AND ADENOIDECTOMY      TOTAL KNEE ARTHROPLASTY Left 09/23/2020    Dr. Quintin Monreal     Review of patient's allergies indicates:   Allergen Reactions    Codeine Nausea Only and Palpitations     Other reaction(s): Headache     Current Outpatient Medications on File Prior to Visit   Medication Sig Dispense Refill    alendronate (FOSAMAX) 70 MG tablet PLEASE SEE ATTACHED FOR DETAILED DIRECTIONS      calcium carbonate/vitamin D3 (CALCIUM WITH VITAMIN D ORAL) Calcium with Vitamin D   1x daily      ciclopirox (PENLAC) 8 % Soln Apply topically nightly. 6.6 mL 5    docosahexaenoic acid/epa (FISH OIL ORAL) Fish Oil   1x daily      estradioL (ESTRACE) 0.01 % (0.1 mg/gram)  vaginal cream SMARTSIG:Gram(s) Topical Twice a Week      fluticasone propionate (FLONASE) 50 mcg/actuation nasal spray SPRAY 2 SPRAYS INTO EACH NOSTRIL EVERY DAY 48 mL 1    gabapentin (NEURONTIN) 300 MG capsule Take 300 mg by mouth every evening.      hydroCHLOROthiazide (MICROZIDE) 12.5 mg capsule TAKE 1 CAPSULE BY MOUTH EVERY DAY 90 capsule 2    IMVEXXY MAINTENANCE PACK 10 mcg Inst Place vaginally.      loratadine (CLARITIN) 10 mg tablet Claritin 10 mg tablet    1 tablet every day by oral route.      montelukast (SINGULAIR) 10 mg tablet TAKE 1 TABLET BY MOUTH EVERY DAY IN THE EVENING 90 tablet 2    multivit with calcium,iron,min (WOMEN'S DAILY MULTIVITAMIN ORAL) Women's Daily Multivitamin   1x daily      naproxen sodium 220 mg Cap       ondansetron (ZOFRAN) 4 MG tablet ondansetron HCl 4 mg tablet      pravastatin (PRAVACHOL) 40 MG tablet TAKE 1 TABLET BY MOUTH EVERY DAY 90 tablet 1    vitamin E mixed/tocotrienol (VITAMIN E COMPLEX ORAL) vitamin E   1x daily      ALPRAZolam (XANAX) 0.25 MG tablet Take 1 tablet (0.25 mg total) by mouth daily as needed for Anxiety. 30 tablet 2    [DISCONTINUED] CLINDESSE vaginal cream INSERT 1 APPLICATORFUL BY VAGINAL ROUT ONCE & THEN REPEAT IN WEEK       No current facility-administered medications on file prior to visit.     Social History     Socioeconomic History    Marital status:    Tobacco Use    Smoking status: Never     Passive exposure: Past    Smokeless tobacco: Never   Substance and Sexual Activity    Alcohol use: Yes     Alcohol/week: 1.0 standard drink     Types: 1 Cans of beer per week     Comment: couple drinks/week    Drug use: Never    Sexual activity: Yes     Partners: Male     Birth control/protection: None     Family History   Problem Relation Age of Onset    Arthritis Mother     COPD Mother     Cancer Father     Diabetes Maternal Uncle     Parkinsonism Paternal Uncle     Heart disease Maternal Grandfather     Heart disease Paternal Grandfather   "      Objective:       /70 (BP Location: Left arm)   Pulse (!) 56   Temp 98 °F (36.7 °C) (Oral)   Resp 16   Ht 5' 6" (1.676 m)   Wt 60.6 kg (133 lb 9.6 oz)   LMP  (LMP Unknown)   SpO2 99%   BMI 21.56 kg/m²      Physical Exam  Vitals and nursing note reviewed.   Constitutional:       Appearance: Normal appearance.   HENT:      Head: Normocephalic and atraumatic.      Right Ear: Tympanic membrane, ear canal and external ear normal.      Left Ear: Tympanic membrane, ear canal and external ear normal.      Nose: Nose normal.      Mouth/Throat:      Mouth: Mucous membranes are moist.      Pharynx: Oropharynx is clear.   Eyes:      Extraocular Movements: Extraocular movements intact.      Conjunctiva/sclera: Conjunctivae normal.      Pupils: Pupils are equal, round, and reactive to light.   Cardiovascular:      Rate and Rhythm: Normal rate and regular rhythm.      Heart sounds: Normal heart sounds.   Pulmonary:      Effort: Pulmonary effort is normal.      Breath sounds: Normal breath sounds.   Musculoskeletal:         General: Normal range of motion.      Cervical back: Normal range of motion and neck supple.   Skin:     General: Skin is warm and dry.   Neurological:      General: No focal deficit present.      Mental Status: She is alert and oriented to person, place, and time.   Psychiatric:         Mood and Affect: Mood normal.         Behavior: Behavior normal.         Thought Content: Thought content normal.         Judgment: Judgment normal.           Assessment and Plan       ICD-10-CM ICD-9-CM   1. Cervical radiculopathy due to intervertebral disc disorder  M50.10 722.91   2. Preoperative clearance  Z01.818 V72.84        1. Cervical radiculopathy due to intervertebral disc disorder  Overview:  September 2022 - States was seen in [place] by Orthopedics who referred her to Neurosurgery/pain management.  She had injections completed in her neck.  Will attempt to get records. Refer to Dr Nayak "     01/27/2023 - consult with Dr Nayak   03/08/2023 - visit with Dr. Nayak, C4-T1 ACDF scheduled        Assessment & Plan:  Patient is scheduled for ACDF of C4-T1 with Dr Nayak on 05/08/2023. Will go to [facility] today for preop labs, EKG, CXR.        2. Preoperative clearance  Comments:  Patient instructed to go to [facility] now for preop testing.  Will complete surgical clearance once labs, EKG, chest x-ray reviewed.           Follow up if symptoms worsen or fail to improve.

## 2023-04-13 NOTE — ASSESSMENT & PLAN NOTE
Patient is scheduled for ACDF of C4-T1 with Dr Nayak on 05/08/2023. Will go to [facility] today for preop labs, EKG, CXR.

## 2023-04-18 ENCOUNTER — TELEPHONE (OUTPATIENT)
Dept: FAMILY MEDICINE | Facility: CLINIC | Age: 63
End: 2023-04-18
Payer: COMMERCIAL

## 2023-04-18 NOTE — TELEPHONE ENCOUNTER
----- Message from Melody Avila sent at 4/18/2023 12:56 PM CDT -----  Regarding: Test results  Type:  Test Results    Who Called: Mirella  Name of Test (Lab/Mammo/Etc): 04/13/23  Date of Test:   Ordering Provider:   Where the test was performed:  Would the patient rather a call back or a response via [facility]? Call back  Best Call Back Number: [phone]  Additional Information:  Crystal called to get results explained to her...She saw them on [facility]

## 2023-04-18 NOTE — TELEPHONE ENCOUNTER
Pt had seen her pre-op labs on her pt portal and noticed there were some levels that were flagged. Discussed lab results with Marian and informed the pt that Marian had already reviewed her labs and none of those levels were concerning other than her Calcium level being elevated. Instructed pt to discontinue her Calcium supplement and we would check her levels for her Wellness appt in September unless she would prefer to recheck that level sooner. Pt states she is fine with checking it at her wellness, but states that her Rheumatologist is the one that had her taking the calcium with the Fosamax for Osteoporosis. She has an appointment with them this week and will discuss d/c'ing medication then.

## 2023-04-27 ENCOUNTER — LAB VISIT (OUTPATIENT)
Dept: LAB | Facility: HOSPITAL | Age: 63
End: 2023-04-27
Attending: INTERNAL MEDICINE
Payer: COMMERCIAL

## 2023-04-27 DIAGNOSIS — E83.52 HYPERCALCEMIA: ICD-10-CM

## 2023-04-27 DIAGNOSIS — Z79.899 ENCOUNTER FOR LONG-TERM (CURRENT) USE OF OTHER MEDICATIONS: Primary | ICD-10-CM

## 2023-04-27 LAB — PTH-INTACT SERPL-MCNC: 84.7 PG/ML (ref 8.7–77)

## 2023-04-27 PROCEDURE — 83970 ASSAY OF PARATHORMONE: CPT

## 2023-04-27 PROCEDURE — 36415 COLL VENOUS BLD VENIPUNCTURE: CPT

## 2023-04-29 DIAGNOSIS — I10 HYPERTENSION, UNSPECIFIED TYPE: ICD-10-CM

## 2023-05-01 RX ORDER — PRAVASTATIN SODIUM 40 MG/1
TABLET ORAL
Qty: 90 TABLET | Refills: 1 | Status: SHIPPED | OUTPATIENT
Start: 2023-05-01 | End: 2023-11-24

## 2023-06-15 DIAGNOSIS — J30.2 SEASONAL ALLERGIES: ICD-10-CM

## 2023-06-15 RX ORDER — FLUTICASONE PROPIONATE 50 MCG
SPRAY, SUSPENSION (ML) NASAL
Qty: 48 ML | Refills: 1 | Status: SHIPPED | OUTPATIENT
Start: 2023-06-15 | End: 2023-12-11

## 2023-08-01 RX ORDER — MONTELUKAST SODIUM 10 MG/1
10 TABLET ORAL NIGHTLY
Qty: 90 TABLET | Refills: 2 | Status: SHIPPED | OUTPATIENT
Start: 2023-08-01

## 2023-08-01 NOTE — TELEPHONE ENCOUNTER
----- Message from Eva Clements sent at 8/1/2023 11:49 AM CDT -----  Regarding: refill  Type:  RX Refill Request    Who Called: pt  Refill or New Rx:refill  RX Name and Strength:montelukast (SINGULAIR) 10 mg tablet  How is the patient currently taking it? (ex. 1XDay):1 in evening  Is this a 30 day or 90 day RX:90  Preferred Pharmacy with phone number: [facility] in [facility]  Local or Mail Order:local  Ordering Provider:nova basilio  Would the patient rather a call back or a response via [facility]? C/b  Best Call Back Number:[phone]  Additional Information:

## 2023-08-01 NOTE — TELEPHONE ENCOUNTER
----- Message from Eva Clements sent at 8/1/2023 11:49 AM CDT -----  Regarding: refill  Type:  RX Refill Request    Who Called: pt  Refill or New Rx:refill  RX Name and Strength:montelukast (SINGULAIR) 10 mg tablet  How is the patient currently taking it? (ex. 1XDay):1 in evening  Is this a 30 day or 90 day RX:90  Preferred Pharmacy with phone number: [facility]  Local or Mail Order:local  Ordering Provider:nova basilio  Would the patient rather a call back or a response via [facility]? C/b  Best Call Back Number:[phone]  Additional Information:

## 2023-09-01 LAB — BCS RECOMMENDATION EXT: NORMAL

## 2023-09-09 DIAGNOSIS — I10 HYPERTENSION, UNSPECIFIED TYPE: ICD-10-CM

## 2023-09-09 RX ORDER — HYDROCHLOROTHIAZIDE 12.5 MG/1
CAPSULE ORAL
Qty: 90 CAPSULE | Refills: 2 | Status: SHIPPED | OUTPATIENT
Start: 2023-09-09

## 2023-09-21 ENCOUNTER — DOCUMENTATION ONLY (OUTPATIENT)
Dept: FAMILY MEDICINE | Facility: CLINIC | Age: 63
End: 2023-09-21
Payer: COMMERCIAL

## 2023-09-21 ENCOUNTER — TELEPHONE (OUTPATIENT)
Dept: FAMILY MEDICINE | Facility: CLINIC | Age: 63
End: 2023-09-21
Payer: COMMERCIAL

## 2023-09-21 DIAGNOSIS — Z00.00 ANNUAL PHYSICAL EXAM: Primary | ICD-10-CM

## 2023-09-21 NOTE — LETTER
AUTHORIZATION FOR RELEASE OF   CONFIDENTIAL INFORMATION    Dear [facility],    We are seeing Crystal Chaney, date of birth 1960, in the clinic at [facility]. Merle Patricia FNP is the patient's PCP. Crystal Chaney has an outstanding lab/procedure at the time we reviewed her chart. In order to help keep her health information updated, she has authorized us to request the following medical record(s):        ( x )  MAMMOGRAM                                      (  )  COLONOSCOPY      (  )  PAP SMEAR                                          (  )  OUTSIDE LAB RESULTS     (  )  DEXA SCAN                                          (  )  EYE EXAM            (  )  FOOT EXAM                                          (  )  ENTIRE RECORD     (  )  OUTSIDE IMMUNIZATIONS                 (  )  _______________         Please fax records to [facility], [place], Kathryn F., FNP, [phone]     If you have any questions, please contact [phone]           Patient Name: Crystal Chaney  : 1960  Patient Phone #: [phone]

## 2023-09-21 NOTE — TELEPHONE ENCOUNTER
Patient rescheduled appointment due to family coming in from out of town.  She completed her Mammogram at [facility].  She will complete her labs at [facility].

## 2023-10-05 ENCOUNTER — TELEPHONE (OUTPATIENT)
Dept: FAMILY MEDICINE | Facility: CLINIC | Age: 63
End: 2023-10-05
Payer: COMMERCIAL

## 2023-10-05 NOTE — TELEPHONE ENCOUNTER
Spoke with patient and notified her that we do not have Covid vaccines.  Patient was given the number to [facility] to see if they would have it available due to her insurance not covering vaccines through a pharmacy.

## 2023-10-05 NOTE — TELEPHONE ENCOUNTER
----- Message from Gauri Burciaga sent at 10/5/2023  1:23 PM CDT -----  Regarding: med advice  .Type:  Needs Medical Advice    Who Called: Pt  Symptoms (please be specific):    How long has patient had these symptoms:    Pharmacy name and phone #:    Would the patient rather a call back or a response via [facility]? Call back  Best Call Back Number: [phone]  Additional Information: Would like for nurse to f/u, pt is concern about not finding any place to have her covid vaccine done.

## 2023-10-17 ENCOUNTER — LAB VISIT (OUTPATIENT)
Dept: LAB | Facility: HOSPITAL | Age: 63
End: 2023-10-17
Attending: NURSE PRACTITIONER
Payer: COMMERCIAL

## 2023-10-17 DIAGNOSIS — Z00.00 ANNUAL PHYSICAL EXAM: ICD-10-CM

## 2023-10-17 LAB
ALBUMIN SERPL-MCNC: 3.9 G/DL (ref 3.4–4.8)
ALBUMIN/GLOB SERPL: 1.3 RATIO (ref 1.1–2)
ALP SERPL-CCNC: 41 UNIT/L (ref 40–150)
ALT SERPL-CCNC: 18 UNIT/L (ref 0–55)
AST SERPL-CCNC: 23 UNIT/L (ref 5–34)
BASOPHILS # BLD AUTO: 0.04 X10(3)/MCL
BASOPHILS NFR BLD AUTO: 0.5 %
BILIRUB SERPL-MCNC: 0.7 MG/DL
BUN SERPL-MCNC: 19.7 MG/DL (ref 9.8–20.1)
CALCIUM SERPL-MCNC: 8.9 MG/DL (ref 8.4–10.2)
CHLORIDE SERPL-SCNC: 107 MMOL/L (ref 98–107)
CHOLEST SERPL-MCNC: 155 MG/DL
CHOLEST/HDLC SERPL: 4 {RATIO} (ref 0–5)
CO2 SERPL-SCNC: 27 MMOL/L (ref 23–31)
CREAT SERPL-MCNC: 0.89 MG/DL (ref 0.55–1.02)
EOSINOPHIL # BLD AUTO: 0.29 X10(3)/MCL (ref 0–0.9)
EOSINOPHIL NFR BLD AUTO: 3.8 %
ERYTHROCYTE [DISTWIDTH] IN BLOOD BY AUTOMATED COUNT: 13.7 % (ref 11.5–17)
EST. AVERAGE GLUCOSE BLD GHB EST-MCNC: 99.7 MG/DL
GFR SERPLBLD CREATININE-BSD FMLA CKD-EPI: >60 MLS/MIN/1.73/M2
GLOBULIN SER-MCNC: 2.9 GM/DL (ref 2.4–3.5)
GLUCOSE SERPL-MCNC: 84 MG/DL (ref 82–115)
HBA1C MFR BLD: 5.1 %
HCT VFR BLD AUTO: 42.3 % (ref 37–47)
HDLC SERPL-MCNC: 36 MG/DL (ref 35–60)
HGB BLD-MCNC: 13.3 G/DL (ref 12–16)
IMM GRANULOCYTES # BLD AUTO: 0.01 X10(3)/MCL (ref 0–0.04)
IMM GRANULOCYTES NFR BLD AUTO: 0.1 %
LDLC SERPL CALC-MCNC: 101 MG/DL (ref 50–140)
LYMPHOCYTES # BLD AUTO: 0.82 X10(3)/MCL (ref 0.6–4.6)
LYMPHOCYTES NFR BLD AUTO: 10.8 %
MCH RBC QN AUTO: 29.7 PG (ref 27–31)
MCHC RBC AUTO-ENTMCNC: 31.4 G/DL (ref 33–36)
MCV RBC AUTO: 94.4 FL (ref 80–94)
MONOCYTES # BLD AUTO: 0.92 X10(3)/MCL (ref 0.1–1.3)
MONOCYTES NFR BLD AUTO: 12.1 %
NEUTROPHILS # BLD AUTO: 5.53 X10(3)/MCL (ref 2.1–9.2)
NEUTROPHILS NFR BLD AUTO: 72.7 %
PLATELET # BLD AUTO: 221 X10(3)/MCL (ref 130–400)
PMV BLD AUTO: 9.8 FL (ref 7.4–10.4)
POTASSIUM SERPL-SCNC: 4.2 MMOL/L (ref 3.5–5.1)
PROT SERPL-MCNC: 6.8 GM/DL (ref 5.8–7.6)
RBC # BLD AUTO: 4.48 X10(6)/MCL (ref 4.2–5.4)
SODIUM SERPL-SCNC: 141 MMOL/L (ref 136–145)
TRIGL SERPL-MCNC: 89 MG/DL (ref 37–140)
TSH SERPL-ACNC: 1.77 UIU/ML (ref 0.35–4.94)
VLDLC SERPL CALC-MCNC: 18 MG/DL
WBC # SPEC AUTO: 7.61 X10(3)/MCL (ref 4.5–11.5)

## 2023-10-17 PROCEDURE — 84443 ASSAY THYROID STIM HORMONE: CPT

## 2023-10-17 PROCEDURE — 80061 LIPID PANEL: CPT

## 2023-10-17 PROCEDURE — 83036 HEMOGLOBIN GLYCOSYLATED A1C: CPT

## 2023-10-17 PROCEDURE — 85025 COMPLETE CBC W/AUTO DIFF WBC: CPT

## 2023-10-17 PROCEDURE — 36415 COLL VENOUS BLD VENIPUNCTURE: CPT

## 2023-10-17 PROCEDURE — 80053 COMPREHEN METABOLIC PANEL: CPT

## 2023-10-24 ENCOUNTER — OFFICE VISIT (OUTPATIENT)
Dept: FAMILY MEDICINE | Facility: CLINIC | Age: 63
End: 2023-10-24
Payer: COMMERCIAL

## 2023-10-24 VITALS
BODY MASS INDEX: 20.86 KG/M2 | DIASTOLIC BLOOD PRESSURE: 86 MMHG | OXYGEN SATURATION: 97 % | HEIGHT: 66 IN | RESPIRATION RATE: 16 BRPM | SYSTOLIC BLOOD PRESSURE: 114 MMHG | TEMPERATURE: 98 F | WEIGHT: 129.81 LBS | HEART RATE: 75 BPM

## 2023-10-24 DIAGNOSIS — Z23 NEED FOR PNEUMOCOCCAL VACCINATION: ICD-10-CM

## 2023-10-24 DIAGNOSIS — Z00.00 ANNUAL PHYSICAL EXAM: Primary | ICD-10-CM

## 2023-10-24 DIAGNOSIS — M15.9 PRIMARY OSTEOARTHRITIS INVOLVING MULTIPLE JOINTS: ICD-10-CM

## 2023-10-24 DIAGNOSIS — E78.5 HYPERLIPIDEMIA, UNSPECIFIED HYPERLIPIDEMIA TYPE: ICD-10-CM

## 2023-10-24 DIAGNOSIS — F32.A ANXIETY AND DEPRESSION: ICD-10-CM

## 2023-10-24 DIAGNOSIS — Z29.11 NEED FOR RSV IMMUNIZATION: ICD-10-CM

## 2023-10-24 DIAGNOSIS — B35.1 ONYCHOMYCOSIS: ICD-10-CM

## 2023-10-24 DIAGNOSIS — M81.0 OSTEOPOROSIS, UNSPECIFIED OSTEOPOROSIS TYPE, UNSPECIFIED PATHOLOGICAL FRACTURE PRESENCE: ICD-10-CM

## 2023-10-24 DIAGNOSIS — J32.9 SINUSITIS, UNSPECIFIED CHRONICITY, UNSPECIFIED LOCATION: ICD-10-CM

## 2023-10-24 DIAGNOSIS — M50.10 CERVICAL RADICULOPATHY DUE TO INTERVERTEBRAL DISC DISORDER: ICD-10-CM

## 2023-10-24 DIAGNOSIS — I10 PRIMARY HYPERTENSION: ICD-10-CM

## 2023-10-24 DIAGNOSIS — R05.9 COUGH, UNSPECIFIED TYPE: ICD-10-CM

## 2023-10-24 DIAGNOSIS — Z23 NEED FOR INFLUENZA VACCINATION: ICD-10-CM

## 2023-10-24 DIAGNOSIS — F41.9 ANXIETY AND DEPRESSION: ICD-10-CM

## 2023-10-24 PROBLEM — M47.812 CERVICAL SPONDYLOSIS WITHOUT MYELOPATHY: Status: ACTIVE | Noted: 2023-05-25

## 2023-10-24 PROBLEM — M15.0 PRIMARY OSTEOARTHRITIS INVOLVING MULTIPLE JOINTS: Status: ACTIVE | Noted: 2023-07-07

## 2023-10-24 PROBLEM — M17.12 OSTEOARTHRITIS OF LEFT KNEE: Status: ACTIVE | Noted: 2023-07-07

## 2023-10-24 PROCEDURE — 3008F PR BODY MASS INDEX (BMI) DOCUMENTED: ICD-10-PCS | Mod: CPTII,,, | Performed by: NURSE PRACTITIONER

## 2023-10-24 PROCEDURE — 3008F BODY MASS INDEX DOCD: CPT | Mod: CPTII,,, | Performed by: NURSE PRACTITIONER

## 2023-10-24 PROCEDURE — 90677 PNEUMOCOCCAL CONJUGATE VACCINE 20-VALENT: ICD-10-PCS | Mod: ,,, | Performed by: NURSE PRACTITIONER

## 2023-10-24 PROCEDURE — 3074F SYST BP LT 130 MM HG: CPT | Mod: CPTII,,, | Performed by: NURSE PRACTITIONER

## 2023-10-24 PROCEDURE — 1159F PR MEDICATION LIST DOCUMENTED IN MEDICAL RECORD: ICD-10-PCS | Mod: CPTII,,, | Performed by: NURSE PRACTITIONER

## 2023-10-24 PROCEDURE — 1101F PT FALLS ASSESS-DOCD LE1/YR: CPT | Mod: CPTII,,, | Performed by: NURSE PRACTITIONER

## 2023-10-24 PROCEDURE — 3288F FALL RISK ASSESSMENT DOCD: CPT | Mod: CPTII,,, | Performed by: NURSE PRACTITIONER

## 2023-10-24 PROCEDURE — 3044F HG A1C LEVEL LT 7.0%: CPT | Mod: CPTII,,, | Performed by: NURSE PRACTITIONER

## 2023-10-24 PROCEDURE — 90471 FLU VACCINE - QUADRIVALENT - ADJUVANTED: ICD-10-PCS | Mod: ,,, | Performed by: NURSE PRACTITIONER

## 2023-10-24 PROCEDURE — 90694 FLU VACCINE - QUADRIVALENT - ADJUVANTED: ICD-10-PCS | Mod: ,,, | Performed by: NURSE PRACTITIONER

## 2023-10-24 PROCEDURE — 90472 IMMUNIZATION ADMIN EACH ADD: CPT | Mod: ,,, | Performed by: NURSE PRACTITIONER

## 2023-10-24 PROCEDURE — 99397 PR PREVENTIVE VISIT,EST,65 & OVER: ICD-10-PCS | Mod: 25,,, | Performed by: NURSE PRACTITIONER

## 2023-10-24 PROCEDURE — 90472 PNEUMOCOCCAL CONJUGATE VACCINE 20-VALENT: ICD-10-PCS | Mod: ,,, | Performed by: NURSE PRACTITIONER

## 2023-10-24 PROCEDURE — 1101F PR PT FALLS ASSESS DOC 0-1 FALLS W/OUT INJ PAST YR: ICD-10-PCS | Mod: CPTII,,, | Performed by: NURSE PRACTITIONER

## 2023-10-24 PROCEDURE — 1160F RVW MEDS BY RX/DR IN RCRD: CPT | Mod: CPTII,,, | Performed by: NURSE PRACTITIONER

## 2023-10-24 PROCEDURE — 99397 PER PM REEVAL EST PAT 65+ YR: CPT | Mod: 25,,, | Performed by: NURSE PRACTITIONER

## 2023-10-24 PROCEDURE — 90471 IMMUNIZATION ADMIN: CPT | Mod: ,,, | Performed by: NURSE PRACTITIONER

## 2023-10-24 PROCEDURE — 3079F PR MOST RECENT DIASTOLIC BLOOD PRESSURE 80-89 MM HG: ICD-10-PCS | Mod: CPTII,,, | Performed by: NURSE PRACTITIONER

## 2023-10-24 PROCEDURE — 90694 VACC AIIV4 NO PRSRV 0.5ML IM: CPT | Mod: ,,, | Performed by: NURSE PRACTITIONER

## 2023-10-24 PROCEDURE — 3074F PR MOST RECENT SYSTOLIC BLOOD PRESSURE < 130 MM HG: ICD-10-PCS | Mod: CPTII,,, | Performed by: NURSE PRACTITIONER

## 2023-10-24 PROCEDURE — 1159F MED LIST DOCD IN RCRD: CPT | Mod: CPTII,,, | Performed by: NURSE PRACTITIONER

## 2023-10-24 PROCEDURE — 3079F DIAST BP 80-89 MM HG: CPT | Mod: CPTII,,, | Performed by: NURSE PRACTITIONER

## 2023-10-24 PROCEDURE — 90677 PCV20 VACCINE IM: CPT | Mod: ,,, | Performed by: NURSE PRACTITIONER

## 2023-10-24 PROCEDURE — 3044F PR MOST RECENT HEMOGLOBIN A1C LEVEL <7.0%: ICD-10-PCS | Mod: CPTII,,, | Performed by: NURSE PRACTITIONER

## 2023-10-24 PROCEDURE — 3288F PR FALLS RISK ASSESSMENT DOCUMENTED: ICD-10-PCS | Mod: CPTII,,, | Performed by: NURSE PRACTITIONER

## 2023-10-24 PROCEDURE — 1160F PR REVIEW ALL MEDS BY PRESCRIBER/CLIN PHARMACIST DOCUMENTED: ICD-10-PCS | Mod: CPTII,,, | Performed by: NURSE PRACTITIONER

## 2023-10-24 RX ORDER — ALPRAZOLAM 0.25 MG/1
0.25 TABLET ORAL DAILY PRN
Qty: 30 TABLET | Refills: 2 | Status: SHIPPED | OUTPATIENT
Start: 2023-10-24

## 2023-10-24 RX ORDER — RESPIRATORY SYNCYTIAL VISUS VACCINE RECOMBINANT, ADJUVANTED 120MCG/0.5
0.5 KIT INTRAMUSCULAR ONCE
Qty: 0.5 ML | Refills: 0 | Status: SHIPPED | OUTPATIENT
Start: 2023-10-24 | End: 2023-10-24

## 2023-10-24 RX ORDER — CHLOPHEDIANOL HCL AND PYRILAMINE MALEATE 12.5; 12.5 MG/5ML; MG/5ML
10 SOLUTION ORAL EVERY 8 HOURS PRN
Qty: 200 ML | Refills: 0 | Status: SHIPPED | OUTPATIENT
Start: 2023-10-24 | End: 2023-12-05 | Stop reason: ALTCHOICE

## 2023-10-24 RX ORDER — VILAZODONE HYDROCHLORIDE 10 MG/1
10 TABLET ORAL DAILY
Qty: 30 TABLET | Refills: 11 | Status: SHIPPED | OUTPATIENT
Start: 2023-10-24 | End: 2023-12-05

## 2023-10-24 RX ORDER — DOXYCYCLINE 100 MG/1
100 CAPSULE ORAL 2 TIMES DAILY
Qty: 20 CAPSULE | Refills: 0 | Status: SHIPPED | OUTPATIENT
Start: 2023-10-24 | End: 2023-12-05 | Stop reason: ALTCHOICE

## 2023-10-24 RX ORDER — MELOXICAM 15 MG/1
15 TABLET ORAL DAILY
Qty: 90 TABLET | Refills: 3 | Status: SHIPPED | OUTPATIENT
Start: 2023-10-24 | End: 2023-12-05

## 2023-10-24 RX ORDER — TERBINAFINE HYDROCHLORIDE 250 MG/1
250 TABLET ORAL DAILY
Qty: 90 TABLET | Refills: 0 | Status: SHIPPED | OUTPATIENT
Start: 2023-10-24 | End: 2023-12-18

## 2023-10-24 RX ORDER — METHOCARBAMOL 750 MG/1
750 TABLET, FILM COATED ORAL 3 TIMES DAILY
COMMUNITY
Start: 2023-06-15

## 2023-10-24 NOTE — PROGRESS NOTES
Subjective:       Patient ID: Crystal Chaney is a 63 y.o. female.    Chief Complaint: Annual Exam      HPI   This is a 63-year-old white female who presents to clinic today for an annual wellness exam.  Patient reports overall she is doing well.  Reports that her osteoporosis has gotten worse and her rheumatologist is trying to get a bone building medication covered for her.  She is frustrated because her insurance keeps denying it.  She is still having issues with anxiety and a little bit of depression, feels overwhelmed with her balance of her work and home life right now.  Denies any suicidal ideations.  Is still having a lot of pain in her neck, about a 7/10 daily.  Is still having toenail fungus as well.  Also has a complaint of sinus congestion, facial pain.  Started a few weeks ago.  Review of Systems  Comprehensive review of systems negative except as stated in HPI    The patient's Health Maintenance was reviewed and the following appears to be due:   Health Maintenance Due   Topic Date Due    TETANUS VACCINE  Never done    COVID-19 Vaccine (9 - 2023-24 season) 09/01/2023    Pneumococcal Vaccines (Age 65+) (1 - PCV) Never done       Past Medical History:  Past Medical History:   Diagnosis Date    Benign paroxysmal vertigo, bilateral     History of cervical dysplasia     Hyperlipidemia     Hypertensive disorder     Osteoarthritis of left knee     Osteoporosis     Personal history of colonic polyps      Past Surgical History:   Procedure Laterality Date    BUNIONECTOMY      COLONOSCOPY  06/16/2015    HYSTERECTOMY      JOINT REPLACEMENT      LAPAROSCOPIC TOTAL HYSTERECTOMY  01/01/2005    SPINE SURGERY  May 8, 2023    Neck surgery - still in recovery    TONSILLECTOMY  1965    TONSILLECTOMY AND ADENOIDECTOMY      TOTAL KNEE ARTHROPLASTY Left 09/23/2020    Dr. Quintin Monreal     Review of patient's allergies indicates:   Allergen Reactions    Codeine Nausea Only and Palpitations     Other reaction(s): Headache      Current Outpatient Medications on File Prior to Visit   Medication Sig Dispense Refill    alendronate (FOSAMAX) 70 MG tablet PLEASE SEE ATTACHED FOR DETAILED DIRECTIONS      calcium carbonate/vitamin D3 (CALCIUM WITH VITAMIN D ORAL) Calcium with Vitamin D   1x daily      estradioL (ESTRACE) 0.01 % (0.1 mg/gram) vaginal cream SMARTSIG:Gram(s) Topical Twice a Week      fluticasone propionate (FLONASE) 50 mcg/actuation nasal spray SPRAY 2 SPRAYS INTO EACH NOSTRIL EVERY DAY 48 mL 1    gabapentin (NEURONTIN) 300 MG capsule Take 300 mg by mouth every evening.      hydroCHLOROthiazide (MICROZIDE) 12.5 mg capsule TAKE 1 CAPSULE BY MOUTH EVERY DAY 90 capsule 2    IMVEXXY MAINTENANCE PACK 10 mcg Inst Place vaginally.      loratadine (CLARITIN) 10 mg tablet Claritin 10 mg tablet    1 tablet every day by oral route.      montelukast (SINGULAIR) 10 mg tablet Take 1 tablet (10 mg total) by mouth every evening. 90 tablet 2    multivit with calcium,iron,min (WOMEN'S DAILY MULTIVITAMIN ORAL) Women's Daily Multivitamin   1x daily      pravastatin (PRAVACHOL) 40 MG tablet TAKE 1 TABLET BY MOUTH EVERY DAY 90 tablet 1    vitamin E mixed/tocotrienol (VITAMIN E COMPLEX ORAL) vitamin E   1x daily      methocarbamoL (ROBAXIN) 750 MG Tab Take 750 mg by mouth 3 (three) times daily.      ondansetron (ZOFRAN) 4 MG tablet ondansetron HCl 4 mg tablet      [DISCONTINUED] ALPRAZolam (XANAX) 0.25 MG tablet Take 1 tablet (0.25 mg total) by mouth daily as needed for Anxiety. 30 tablet 2    [DISCONTINUED] ciclopirox (PENLAC) 8 % Soln Apply topically nightly. 6.6 mL 5    [DISCONTINUED] docosahexaenoic acid/epa (FISH OIL ORAL) Fish Oil   1x daily      [DISCONTINUED] naproxen sodium 220 mg Cap        No current facility-administered medications on file prior to visit.     Social History     Socioeconomic History    Marital status:    Tobacco Use    Smoking status: Never     Passive exposure: Past    Smokeless tobacco: Never   Substance and  "Sexual Activity    Alcohol use: Yes     Alcohol/week: 1.0 standard drink of alcohol     Types: 1 Cans of beer per week     Comment: couple drinks/week    Drug use: Never    Sexual activity: Yes     Partners: Male     Birth control/protection: None     Family History   Problem Relation Age of Onset    Arthritis Mother     COPD Mother     Cancer Father     Diabetes Maternal Uncle     Parkinsonism Paternal Uncle     Heart disease Maternal Grandfather     Heart disease Paternal Grandfather     Miscarriages / Stillbirths Daughter        Objective:       /86 (BP Location: Left arm)   Pulse 75   Temp 98.1 °F (36.7 °C) (Temporal)   Resp 16   Ht 5' 6" (1.676 m)   Wt 58.9 kg (129 lb 12.8 oz)   LMP  (LMP Unknown)   SpO2 97%   BMI 20.95 kg/m²      Physical Exam  Vitals and nursing note reviewed.   Constitutional:       Appearance: Normal appearance. She is normal weight.   HENT:      Head: Normocephalic and atraumatic.      Right Ear: Tympanic membrane, ear canal and external ear normal.      Left Ear: Tympanic membrane, ear canal and external ear normal.      Nose: Rhinorrhea present.      Left Sinus: Maxillary sinus tenderness present.      Mouth/Throat:      Mouth: Mucous membranes are moist.      Pharynx: Oropharynx is clear.   Eyes:      Extraocular Movements: Extraocular movements intact.      Conjunctiva/sclera: Conjunctivae normal.      Pupils: Pupils are equal, round, and reactive to light.   Cardiovascular:      Rate and Rhythm: Normal rate and regular rhythm.      Heart sounds: Normal heart sounds.   Pulmonary:      Effort: Pulmonary effort is normal.      Breath sounds: Normal breath sounds.   Abdominal:      General: Abdomen is flat. Bowel sounds are normal.      Palpations: Abdomen is soft.   Musculoskeletal:         General: Normal range of motion.      Cervical back: Normal range of motion and neck supple.   Feet:      Right foot:      Toenail Condition: Right toenails are normal.      Left foot: "      Toenail Condition: Fungal disease present.  Skin:     General: Skin is warm and dry.   Neurological:      General: No focal deficit present.      Mental Status: She is alert and oriented to person, place, and time.   Psychiatric:         Mood and Affect: Mood normal.         Behavior: Behavior normal.         Thought Content: Thought content normal.         Judgment: Judgment normal.         Labs  Lab Visit on 10/17/2023   Component Date Value Ref Range Status    Sodium Level 10/17/2023 141  136 - 145 mmol/L Final    Potassium Level 10/17/2023 4.2  3.5 - 5.1 mmol/L Final    Chloride 10/17/2023 107  98 - 107 mmol/L Final    Carbon Dioxide 10/17/2023 27  23 - 31 mmol/L Final    Glucose Level 10/17/2023 84  82 - 115 mg/dL Final    Blood Urea Nitrogen 10/17/2023 19.7  9.8 - 20.1 mg/dL Final    Creatinine 10/17/2023 0.89  0.55 - 1.02 mg/dL Final    Calcium Level Total 10/17/2023 8.9  8.4 - 10.2 mg/dL Final    Protein Total 10/17/2023 6.8  5.8 - 7.6 gm/dL Final    Albumin Level 10/17/2023 3.9  3.4 - 4.8 g/dL Final    Globulin 10/17/2023 2.9  2.4 - 3.5 gm/dL Final    Albumin/Globulin Ratio 10/17/2023 1.3  1.1 - 2.0 ratio Final    Bilirubin Total 10/17/2023 0.7  <=1.5 mg/dL Final    Alkaline Phosphatase 10/17/2023 41  40 - 150 unit/L Final    Alanine Aminotransferase 10/17/2023 18  0 - 55 unit/L Final    Aspartate Aminotransferase 10/17/2023 23  5 - 34 unit/L Final    eGFR 10/17/2023 >60  mls/min/1.73/m2 Final    Cholesterol Total 10/17/2023 155  <=200 mg/dL Final    HDL Cholesterol 10/17/2023 36  35 - 60 mg/dL Final    Triglyceride 10/17/2023 89  37 - 140 mg/dL Final    Cholesterol/HDL Ratio 10/17/2023 4  0 - 5 Final    Very Low Density Lipoprotein 10/17/2023 18   Final    LDL Cholesterol 10/17/2023 101.00  50.00 - 140.00 mg/dL Final    TSH 10/17/2023 1.767  0.350 - 4.940 uIU/mL Final    Hemoglobin A1c 10/17/2023 5.1  <=7.0 % Final    Estimated Average Glucose 10/17/2023 99.7  mg/dL Final    WBC 10/17/2023 7.61  4.50  - 11.50 x10(3)/mcL Final    RBC 10/17/2023 4.48  4.20 - 5.40 x10(6)/mcL Final    Hgb 10/17/2023 13.3  12.0 - 16.0 g/dL Final    Hct 10/17/2023 42.3  37.0 - 47.0 % Final    MCV 10/17/2023 94.4 (H)  80.0 - 94.0 fL Final    MCH 10/17/2023 29.7  27.0 - 31.0 pg Final    MCHC 10/17/2023 31.4 (L)  33.0 - 36.0 g/dL Final    RDW 10/17/2023 13.7  11.5 - 17.0 % Final    Platelet 10/17/2023 221  130 - 400 x10(3)/mcL Final    MPV 10/17/2023 9.8  7.4 - 10.4 fL Final    Neut % 10/17/2023 72.7  % Final    Lymph % 10/17/2023 10.8  % Final    Mono % 10/17/2023 12.1  % Final    Eos % 10/17/2023 3.8  % Final    Basophil % 10/17/2023 0.5  % Final    Lymph # 10/17/2023 0.82  0.6 - 4.6 x10(3)/mcL Final    Neut # 10/17/2023 5.53  2.1 - 9.2 x10(3)/mcL Final    Mono # 10/17/2023 0.92  0.1 - 1.3 x10(3)/mcL Final    Eos # 10/17/2023 0.29  0 - 0.9 x10(3)/mcL Final    Baso # 10/17/2023 0.04  <=0.2 x10(3)/mcL Final    IG# 10/17/2023 0.01  0 - 0.04 x10(3)/mcL Final    IG% 10/17/2023 0.1  % Final   Documentation Only on 09/21/2023   Component Date Value Ref Range Status    BCS Recommendation External 09/01/2023 Repeat mammogram in 1 year   Final    BCS Recommendation External 04/29/2022 Repeat mammogram in 1 year   Final    BCS Recommendation External 01/22/2021 Repeat mammogram in 1 year   Final   Lab Visit on 04/27/2023   Component Date Value Ref Range Status    Parathyroid Hormone Intact 04/27/2023 84.7 (H)  8.7 - 77.0 pg/mL Final       Assessment and Plan       ICD-10-CM ICD-9-CM   1. Annual physical exam  Z00.00 V70.0   2. Osteoporosis, unspecified osteoporosis type, unspecified pathological fracture presence  M81.0 733.00   3. Hyperlipidemia, unspecified hyperlipidemia type  E78.5 272.4   4. Primary hypertension  I10 401.9   5. Need for influenza vaccination  Z23 V04.81   6. Need for pneumococcal vaccination  Z23 V03.82   7. Sinusitis, unspecified chronicity, unspecified location  J32.9 473.9   8. Cough, unspecified type  R05.9 786.2   9.  Primary osteoarthritis involving multiple joints  M15.9 715.98   10. Need for RSV immunization  Z29.11 V04.82   11. Onychomycosis  B35.1 110.1   12. Anxiety and depression  F41.9 300.00    F32.A 311   13. Cervical radiculopathy due to intervertebral disc disorder  M50.10 722.91        1. Annual physical exam  Overview:  Annual exam yearly in October    Assessment & Plan:  Labs reviewed in detail with patient, repeat 1 year.      2. Osteoporosis, unspecified osteoporosis type, unspecified pathological fracture presence  Overview:  Managed by Dr Goldberg  Alendronate 70 mg weekly  DEXA every 2 years, next due April 2025    Assessment & Plan:  DEXA completed per Dr. Goldberg, patient reports trying to get Evenity covered by insurance and has been denied.  Records requested.  Follow-up here 6 months.      3. Hyperlipidemia, unspecified hyperlipidemia type  Overview:  Pravastatin 40 mg daily    Assessment & Plan:  Stable, total cholesterol 155, .  Continue pravastatin 40 mg daily, follow-up with repeat lipid panel in 6 months.      4. Primary hypertension  Overview:  HCTZ daily    Assessment & Plan:  Stable, continue HCTZ daily, follow-up 6 months.      5. Need for influenza vaccination  -     Influenza (FLUAD) - Quadrivalent (Adjuvanted) *Preferred* (65+) (PF)    6. Need for pneumococcal vaccination  -     (In Office Administered) Pneumococcal Conjugate Vaccine (20 Valent) (IM) (Preferred)    7. Sinusitis, unspecified chronicity, unspecified location  -     doxycycline (VIBRAMYCIN) 100 MG Cap; Take 1 capsule (100 mg total) by mouth 2 (two) times daily. Please take with full glass of water and remain upright for 30 minutes after administration  Dispense: 20 capsule; Refill: 0    8. Cough, unspecified type  -     pyrilamine-chlophedianoL (NINJACOF) 12.5-12.5 mg/5 mL Liqd; Take 10 mLs by mouth every 8 (eight) hours as needed (cough/congestion).  Dispense: 200 mL; Refill: 0    9. Primary osteoarthritis involving  multiple joints  Overview:  Dr Goldberg    10/24/2023 - trial of meloxicam 15 mg daily     Assessment & Plan:  Trial of meloxicam 15 mg daily.  Follow-up 6 weeks.    Orders:  -     meloxicam (MOBIC) 15 MG tablet; Take 1 tablet (15 mg total) by mouth once daily.  Dispense: 90 tablet; Refill: 3    10. Need for RSV immunization  -     RSVPreF3 antigen-AS01E, PF, (AREXVY, PF,) 120 mcg/0.5 mL SusR vaccine; Inject 0.5 mLs into the muscle once. for 1 dose  Dispense: 0.5 mL; Refill: 0    11. Onychomycosis  Overview:  09/27/2022 - trial of Penlac    10/24/2023 - terbinafine 250 mg daily    Assessment & Plan:  Trial of terbinafine 250 mg daily for 90 days.  Liver enzymes reviewed, normal.    Orders:  -     terbinafine HCL (LAMISIL) 250 mg tablet; Take 1 tablet (250 mg total) by mouth once daily.  Dispense: 90 tablet; Refill: 0    12. Anxiety and depression  Overview:  Initially diagnosed in 2000 while going through a divorce. Took medication for about a year and a half. Took Xanax as needed. Stopped meds after about 1.5 years.     09/27/2022 - Xanax 0.25 mg daily as needed    10/24/2023 - Viibryd 10 mg daily, refill Xanax 0.25 mg daily as needed    Assessment & Plan:  Trial of Viibryd 10 mg daily.  Follow-up 6 weeks.  Refill Xanax 0.25 mg to take sparingly as needed.    Orders:  -     vilazodone (VIIBRYD) 10 mg Tab tablet; Take 1 tablet (10 mg total) by mouth once daily.  Dispense: 30 tablet; Refill: 11  -     ALPRAZolam (XANAX) 0.25 MG tablet; Take 1 tablet (0.25 mg total) by mouth daily as needed for Anxiety.  Dispense: 30 tablet; Refill: 2    13. Cervical radiculopathy due to intervertebral disc disorder  Overview:  September 2022 - States was seen in [place] by Orthopedics who referred her to Neurosurgery/pain management.  She had injections completed in her neck.  Will attempt to get records. Refer to Dr Nayak     01/27/2023 - consult with Dr Nayak   03/08/2023 - visit with Dr. Nayak, C4-T1 ACDF  scheduled  05/08/2023 - C4-T1 ACDF      Assessment & Plan:  Patient still with some significant pain.  Spine surgeon told her that the fusion most likely flared up the arthritis.  She is seeing Rheumatology as well.  Trial of meloxicam 15 mg daily.  Follow-up 6 weeks.             Follow up in about 6 weeks (around 12/5/2023) for follow up.

## 2023-10-24 NOTE — ASSESSMENT & PLAN NOTE
Stable, total cholesterol 155, .  Continue pravastatin 40 mg daily, follow-up with repeat lipid panel in 6 months.

## 2023-10-24 NOTE — ASSESSMENT & PLAN NOTE
DEXA completed per Dr. Goldberg, patient reports trying to get Evenity covered by insurance and has been denied.  Records requested.  Follow-up here 6 months.

## 2023-10-24 NOTE — LETTER
AUTHORIZATION FOR RELEASE OF   CONFIDENTIAL INFORMATION    Dear Dr. Goldberg,    We are seeing Crystal Chaney, date of birth 1960, in the clinic at [facility]. Merle Patricia FNP is the patient's PCP. Crystal Chaney has an outstanding lab/procedure at the time we reviewed her chart. In order to help keep her health information updated, she has authorized us to request the following medical record(s):        (  )  MAMMOGRAM                                      (  )  COLONOSCOPY      (  )  PAP SMEAR                                          (  )  OUTSIDE LAB RESULTS     (  )  DEXA SCAN                                          (  )  EYE EXAM            (  )  FOOT EXAM                                          (  )  ENTIRE RECORD     (  )  OUTSIDE IMMUNIZATIONS                 (X)  Recent Office Notes         Please fax records to [facility], [place], Kathryn F., FNP, [phone]     If you have any questions, please contact us at [phone].        Patient Name: Crystal Chaney  : 1960  Patient Phone #: [phone]

## 2023-10-24 NOTE — ASSESSMENT & PLAN NOTE
Patient still with some significant pain.  Spine surgeon told her that the fusion most likely flared up the arthritis.  She is seeing Rheumatology as well.  Trial of meloxicam 15 mg daily.  Follow-up 6 weeks.

## 2023-10-24 NOTE — ASSESSMENT & PLAN NOTE
Trial of Viibryd 10 mg daily.  Follow-up 6 weeks.  Refill Xanax 0.25 mg to take sparingly as needed.

## 2023-11-23 DIAGNOSIS — I10 HYPERTENSION, UNSPECIFIED TYPE: ICD-10-CM

## 2023-11-24 RX ORDER — PRAVASTATIN SODIUM 40 MG/1
TABLET ORAL
Qty: 90 TABLET | Refills: 1 | Status: SHIPPED | OUTPATIENT
Start: 2023-11-24

## 2023-11-28 ENCOUNTER — TELEPHONE (OUTPATIENT)
Dept: FAMILY MEDICINE | Facility: CLINIC | Age: 63
End: 2023-11-28
Payer: COMMERCIAL

## 2023-11-28 NOTE — TELEPHONE ENCOUNTER
1. Are there any outstanding tasks in the patient's chart? (Ex. Labs, MMG)?N    2. Do we have any outstanding/Pending referrals?N    3. Has the patient been seen in an ER, Urgent Care or been admitted to the hospital since last office visit with our office?N    4. Has the patient seen any other health care provider (doctors) since last visit?N    5. Has the patient had any blood work or x-rays done since last visit?N

## 2023-12-05 ENCOUNTER — OFFICE VISIT (OUTPATIENT)
Dept: FAMILY MEDICINE | Facility: CLINIC | Age: 63
End: 2023-12-05
Payer: COMMERCIAL

## 2023-12-05 VITALS
DIASTOLIC BLOOD PRESSURE: 84 MMHG | TEMPERATURE: 98 F | OXYGEN SATURATION: 97 % | HEIGHT: 66 IN | BODY MASS INDEX: 20.73 KG/M2 | SYSTOLIC BLOOD PRESSURE: 116 MMHG | WEIGHT: 129 LBS | HEART RATE: 67 BPM | RESPIRATION RATE: 16 BRPM

## 2023-12-05 DIAGNOSIS — M15.9 PRIMARY OSTEOARTHRITIS INVOLVING MULTIPLE JOINTS: ICD-10-CM

## 2023-12-05 DIAGNOSIS — M81.0 OSTEOPOROSIS, UNSPECIFIED OSTEOPOROSIS TYPE, UNSPECIFIED PATHOLOGICAL FRACTURE PRESENCE: ICD-10-CM

## 2023-12-05 DIAGNOSIS — F32.A ANXIETY AND DEPRESSION: ICD-10-CM

## 2023-12-05 DIAGNOSIS — F41.9 ANXIETY AND DEPRESSION: ICD-10-CM

## 2023-12-05 DIAGNOSIS — M50.10 CERVICAL RADICULOPATHY DUE TO INTERVERTEBRAL DISC DISORDER: Primary | ICD-10-CM

## 2023-12-05 DIAGNOSIS — B35.1 ONYCHOMYCOSIS: ICD-10-CM

## 2023-12-05 PROCEDURE — 1159F MED LIST DOCD IN RCRD: CPT | Mod: CPTII,,, | Performed by: NURSE PRACTITIONER

## 2023-12-05 PROCEDURE — 3074F PR MOST RECENT SYSTOLIC BLOOD PRESSURE < 130 MM HG: ICD-10-PCS | Mod: CPTII,,, | Performed by: NURSE PRACTITIONER

## 2023-12-05 PROCEDURE — 3008F PR BODY MASS INDEX (BMI) DOCUMENTED: ICD-10-PCS | Mod: CPTII,,, | Performed by: NURSE PRACTITIONER

## 2023-12-05 PROCEDURE — 3079F DIAST BP 80-89 MM HG: CPT | Mod: CPTII,,, | Performed by: NURSE PRACTITIONER

## 2023-12-05 PROCEDURE — 3079F PR MOST RECENT DIASTOLIC BLOOD PRESSURE 80-89 MM HG: ICD-10-PCS | Mod: CPTII,,, | Performed by: NURSE PRACTITIONER

## 2023-12-05 PROCEDURE — 3044F HG A1C LEVEL LT 7.0%: CPT | Mod: CPTII,,, | Performed by: NURSE PRACTITIONER

## 2023-12-05 PROCEDURE — 3044F PR MOST RECENT HEMOGLOBIN A1C LEVEL <7.0%: ICD-10-PCS | Mod: CPTII,,, | Performed by: NURSE PRACTITIONER

## 2023-12-05 PROCEDURE — 1159F PR MEDICATION LIST DOCUMENTED IN MEDICAL RECORD: ICD-10-PCS | Mod: CPTII,,, | Performed by: NURSE PRACTITIONER

## 2023-12-05 PROCEDURE — 3074F SYST BP LT 130 MM HG: CPT | Mod: CPTII,,, | Performed by: NURSE PRACTITIONER

## 2023-12-05 PROCEDURE — 99214 PR OFFICE/OUTPT VISIT, EST, LEVL IV, 30-39 MIN: ICD-10-PCS | Mod: ,,, | Performed by: NURSE PRACTITIONER

## 2023-12-05 PROCEDURE — 99214 OFFICE O/P EST MOD 30 MIN: CPT | Mod: ,,, | Performed by: NURSE PRACTITIONER

## 2023-12-05 PROCEDURE — 3008F BODY MASS INDEX DOCD: CPT | Mod: CPTII,,, | Performed by: NURSE PRACTITIONER

## 2023-12-05 PROCEDURE — 1160F PR REVIEW ALL MEDS BY PRESCRIBER/CLIN PHARMACIST DOCUMENTED: ICD-10-PCS | Mod: CPTII,,, | Performed by: NURSE PRACTITIONER

## 2023-12-05 PROCEDURE — 1160F RVW MEDS BY RX/DR IN RCRD: CPT | Mod: CPTII,,, | Performed by: NURSE PRACTITIONER

## 2023-12-05 RX ORDER — CELECOXIB 100 MG/1
100 CAPSULE ORAL 2 TIMES DAILY
Qty: 180 CAPSULE | Refills: 3 | Status: SHIPPED | OUTPATIENT
Start: 2023-12-05

## 2023-12-05 RX ORDER — VILAZODONE HYDROCHLORIDE 20 MG/1
20 TABLET ORAL DAILY
Qty: 90 TABLET | Refills: 3 | Status: SHIPPED | OUTPATIENT
Start: 2023-12-05 | End: 2024-12-04

## 2023-12-05 NOTE — PROGRESS NOTES
Subjective:       Patient ID: Crystal Chaney is a 63 y.o. female.    Chief Complaint: Anxiety (1 month f/u), Depression (1 month f/u/Medication not helping), Cervical Radiculopathy (1 month f/u), and Osteoarthritis (1 month f/u)      HPI   This is a 63-year-old white female who presents to clinic today for a 6 week follow-up for anxiety, depression, osteoarthritis.  Reports initially anxiety and depression maybe got slightly better but no longer feels like the Viibryd is really helping.  Reports still having significant daily pain.  Review of Systems  Comprehensive review of systems negative except as stated in HPI    The patient's Health Maintenance was reviewed and the following appears to be due:   Health Maintenance Due   Topic Date Due    TETANUS VACCINE  Never done    RSV Vaccine (Age 60+ and Pregnant patients) (1 - 1-dose 60+ series) Never done       Past Medical History:  Past Medical History:   Diagnosis Date    Benign paroxysmal vertigo, bilateral     History of cervical dysplasia     Hyperlipidemia     Hypertensive disorder     Osteoarthritis of left knee     Osteoporosis     Personal history of colonic polyps      Past Surgical History:   Procedure Laterality Date    BUNIONECTOMY      COLONOSCOPY  06/16/2015    HYSTERECTOMY      JOINT REPLACEMENT      LAPAROSCOPIC TOTAL HYSTERECTOMY  01/01/2005    SPINE SURGERY  May 8, 2023    Neck surgery - still in recovery    TONSILLECTOMY  1965    TONSILLECTOMY AND ADENOIDECTOMY      TOTAL KNEE ARTHROPLASTY Left 09/23/2020    Dr. Quintin Monreal     Review of patient's allergies indicates:   Allergen Reactions    Codeine Nausea Only and Palpitations     Other reaction(s): Headache     Current Outpatient Medications on File Prior to Visit   Medication Sig Dispense Refill    alendronate (FOSAMAX) 70 MG tablet PLEASE SEE ATTACHED FOR DETAILED DIRECTIONS      ALPRAZolam (XANAX) 0.25 MG tablet Take 1 tablet (0.25 mg total) by mouth daily as needed for Anxiety. 30 tablet 2     calcium carbonate/vitamin D3 (CALCIUM WITH VITAMIN D ORAL) Calcium with Vitamin D   1x daily      estradioL (ESTRACE) 0.01 % (0.1 mg/gram) vaginal cream SMARTSIG:Gram(s) Topical Twice a Week      fluticasone propionate (FLONASE) 50 mcg/actuation nasal spray SPRAY 2 SPRAYS INTO EACH NOSTRIL EVERY DAY 48 mL 1    gabapentin (NEURONTIN) 300 MG capsule Take 300 mg by mouth every evening.      hydroCHLOROthiazide (MICROZIDE) 12.5 mg capsule TAKE 1 CAPSULE BY MOUTH EVERY DAY 90 capsule 2    IMVEXXY MAINTENANCE PACK 10 mcg Inst Place vaginally.      loratadine (CLARITIN) 10 mg tablet Claritin 10 mg tablet    1 tablet every day by oral route.      methocarbamoL (ROBAXIN) 750 MG Tab Take 750 mg by mouth 3 (three) times daily.      montelukast (SINGULAIR) 10 mg tablet Take 1 tablet (10 mg total) by mouth every evening. 90 tablet 2    multivit with calcium,iron,min (WOMEN'S DAILY MULTIVITAMIN ORAL) Women's Daily Multivitamin   1x daily      ondansetron (ZOFRAN) 4 MG tablet ondansetron HCl 4 mg tablet      pravastatin (PRAVACHOL) 40 MG tablet TAKE 1 TABLET BY MOUTH EVERY DAY 90 tablet 1    terbinafine HCL (LAMISIL) 250 mg tablet Take 1 tablet (250 mg total) by mouth once daily. 90 tablet 0    vitamin E mixed/tocotrienol (VITAMIN E COMPLEX ORAL) vitamin E   1x daily      [DISCONTINUED] meloxicam (MOBIC) 15 MG tablet Take 1 tablet (15 mg total) by mouth once daily. 90 tablet 3    [DISCONTINUED] vilazodone (VIIBRYD) 10 mg Tab tablet Take 1 tablet (10 mg total) by mouth once daily. 30 tablet 11    [DISCONTINUED] doxycycline (VIBRAMYCIN) 100 MG Cap Take 1 capsule (100 mg total) by mouth 2 (two) times daily. Please take with full glass of water and remain upright for 30 minutes after administration 20 capsule 0    [DISCONTINUED] pyrilamine-chlophedianoL (NINJACOF) 12.5-12.5 mg/5 mL Liqd Take 10 mLs by mouth every 8 (eight) hours as needed (cough/congestion). 200 mL 0     No current facility-administered medications on file prior to  "visit.     Social History     Socioeconomic History    Marital status:    Tobacco Use    Smoking status: Never     Passive exposure: Past    Smokeless tobacco: Never   Substance and Sexual Activity    Alcohol use: Yes     Alcohol/week: 1.0 standard drink of alcohol     Types: 1 Cans of beer per week     Comment: couple drinks/week    Drug use: Never    Sexual activity: Yes     Partners: Male     Birth control/protection: None     Family History   Problem Relation Age of Onset    Arthritis Mother     COPD Mother     Cancer Father     Diabetes Maternal Uncle     Parkinsonism Paternal Uncle     Heart disease Maternal Grandfather     Heart disease Paternal Grandfather     Miscarriages / Stillbirths Daughter        Objective:       /84 (BP Location: Left arm)   Pulse 67   Temp 97.7 °F (36.5 °C) (Oral)   Resp 16   Ht 5' 6" (1.676 m)   Wt 58.5 kg (129 lb)   LMP  (LMP Unknown)   SpO2 97%   BMI 20.82 kg/m²      Physical Exam  Vitals and nursing note reviewed.   Constitutional:       Appearance: Normal appearance.   HENT:      Head: Normocephalic and atraumatic.      Right Ear: Tympanic membrane, ear canal and external ear normal.      Left Ear: Tympanic membrane, ear canal and external ear normal.      Nose: Nose normal.      Mouth/Throat:      Mouth: Mucous membranes are moist.      Pharynx: Oropharynx is clear.   Eyes:      Extraocular Movements: Extraocular movements intact.      Conjunctiva/sclera: Conjunctivae normal.      Pupils: Pupils are equal, round, and reactive to light.   Cardiovascular:      Rate and Rhythm: Normal rate and regular rhythm.      Heart sounds: Normal heart sounds.   Pulmonary:      Effort: Pulmonary effort is normal.      Breath sounds: Normal breath sounds.   Musculoskeletal:         General: Normal range of motion.      Cervical back: Normal range of motion and neck supple.   Skin:     General: Skin is warm and dry.   Neurological:      General: No focal deficit present.    "   Mental Status: She is alert and oriented to person, place, and time.   Psychiatric:         Mood and Affect: Mood normal.         Behavior: Behavior normal.         Thought Content: Thought content normal.         Judgment: Judgment normal.             Assessment and Plan       ICD-10-CM ICD-9-CM   1. Cervical radiculopathy due to intervertebral disc disorder  M50.10 722.91   2. Anxiety and depression  F41.9 300.00    F32.A 311   3. Primary osteoarthritis involving multiple joints  M15.9 715.98   4. Onychomycosis  B35.1 110.1   5. Osteoporosis, unspecified osteoporosis type, unspecified pathological fracture presence  M81.0 733.00        1. Cervical radiculopathy due to intervertebral disc disorder  Overview:  September 2022 - States was seen in [place] by Orthopedics who referred her to Neurosurgery/pain management.  She had injections completed in her neck.  Will attempt to get records. Refer to Dr Nayak     01/27/2023 - consult with Dr Nayak   03/08/2023 - visit with Dr. Nayak, C4-T1 ACDF scheduled  05/08/2023 - C4-T1 ACDF      Assessment & Plan:  Still with significant pain, has CT scheduled an appointment with Dr Nayak today.       2. Anxiety and depression  Overview:  Initially diagnosed in 2000 while going through a divorce. Took medication for about a year and a half. Took Xanax as needed. Stopped meds after about 1.5 years.     09/27/2022 - Xanax 0.25 mg daily as needed    10/24/2023 - Viibryd 10 mg daily, refill Xanax 0.25 mg daily as needed    12/05/2023 - Viibryd 20 mg daily    Assessment & Plan:  Mildly improved, increase Viibryd 20 mg daily, follow-up 4 weeks.    Orders:  -     vilazodone (VIIBRYD) 20 mg Tab; Take 1 tablet (20 mg total) by mouth Daily.  Dispense: 90 tablet; Refill: 3    3. Primary osteoarthritis involving multiple joints  Overview:  Dr Goldberg    10/24/2023 - trial of meloxicam 15 mg daily     12/05/2023 - discontinue meloxicam, did not help, start Celebrex  100 mg twice daily    Assessment & Plan:  Discontinue meloxicam, did not help.  Start Celebrex 100 mg twice daily.  Encouraged to speak to Dr. Goldberg about severity of her daily arthritis pain.  Patient verbalized understanding.  Follow-up here in 4 weeks.    Orders:  -     celecoxib (CELEBREX) 100 MG capsule; Take 1 capsule (100 mg total) by mouth 2 (two) times daily.  Dispense: 180 capsule; Refill: 3    4. Onychomycosis  Overview:  09/27/2022 - trial of [facility]    10/24/2023 - terbinafine 250 mg daily    Assessment & Plan:  Has not seen any improvement, instructed to complete full 90 days of terbinafine.      5. Osteoporosis, unspecified osteoporosis type, unspecified pathological fracture presence  Overview:  Managed by Dr Yusuf Parker every 6 months  DEXA every 2 years, next due April 2025    Assessment & Plan:  Follow-up with Dr. Goldberg for Prolia as scheduled.             Follow up in about 4 weeks (around 1/2/2024) for follow up.

## 2023-12-05 NOTE — ASSESSMENT & PLAN NOTE
Discontinue meloxicam, did not help.  Start Celebrex 100 mg twice daily.  Encouraged to speak to Dr. Goldberg about severity of her daily arthritis pain.  Patient verbalized understanding.  Follow-up here in 4 weeks.

## 2023-12-09 DIAGNOSIS — J30.2 SEASONAL ALLERGIES: ICD-10-CM

## 2023-12-11 RX ORDER — FLUTICASONE PROPIONATE 50 MCG
SPRAY, SUSPENSION (ML) NASAL
Qty: 48 ML | Refills: 1 | Status: SHIPPED | OUTPATIENT
Start: 2023-12-11

## 2023-12-18 DIAGNOSIS — B35.1 ONYCHOMYCOSIS: ICD-10-CM

## 2023-12-18 RX ORDER — TERBINAFINE HYDROCHLORIDE 250 MG/1
250 TABLET ORAL
Qty: 90 TABLET | Refills: 0 | Status: SHIPPED | OUTPATIENT
Start: 2023-12-18

## 2023-12-19 ENCOUNTER — TELEPHONE (OUTPATIENT)
Dept: FAMILY MEDICINE | Facility: CLINIC | Age: 63
End: 2023-12-19
Payer: COMMERCIAL

## 2023-12-19 NOTE — TELEPHONE ENCOUNTER
Are there any outstanding tasks in patient's chart?  n    2. Do we have outstanding/pending referrals?  n    3. Has the patient been seen in an ER, Urgent Care, or admitted since last visit?  n    4. Has patient seen any other health care providers since last visit?  n    5.  Has patient had any blood work or x-rays done since last visit?   N

## 2024-01-02 ENCOUNTER — OFFICE VISIT (OUTPATIENT)
Dept: FAMILY MEDICINE | Facility: CLINIC | Age: 64
End: 2024-01-02
Payer: COMMERCIAL

## 2024-01-02 VITALS
HEIGHT: 66 IN | TEMPERATURE: 98 F | RESPIRATION RATE: 16 BRPM | DIASTOLIC BLOOD PRESSURE: 78 MMHG | HEART RATE: 68 BPM | BODY MASS INDEX: 20.82 KG/M2 | SYSTOLIC BLOOD PRESSURE: 108 MMHG | OXYGEN SATURATION: 97 %

## 2024-01-02 DIAGNOSIS — F41.9 ANXIETY AND DEPRESSION: Primary | ICD-10-CM

## 2024-01-02 DIAGNOSIS — E78.5 HYPERLIPIDEMIA, UNSPECIFIED HYPERLIPIDEMIA TYPE: ICD-10-CM

## 2024-01-02 DIAGNOSIS — F32.A ANXIETY AND DEPRESSION: Primary | ICD-10-CM

## 2024-01-02 DIAGNOSIS — M15.9 PRIMARY OSTEOARTHRITIS INVOLVING MULTIPLE JOINTS: ICD-10-CM

## 2024-01-02 PROCEDURE — 3008F BODY MASS INDEX DOCD: CPT | Mod: CPTII,,, | Performed by: NURSE PRACTITIONER

## 2024-01-02 PROCEDURE — 3074F SYST BP LT 130 MM HG: CPT | Mod: CPTII,,, | Performed by: NURSE PRACTITIONER

## 2024-01-02 PROCEDURE — 99214 OFFICE O/P EST MOD 30 MIN: CPT | Mod: ,,, | Performed by: NURSE PRACTITIONER

## 2024-01-02 PROCEDURE — 1126F AMNT PAIN NOTED NONE PRSNT: CPT | Mod: CPTII,,, | Performed by: NURSE PRACTITIONER

## 2024-01-02 PROCEDURE — 1160F RVW MEDS BY RX/DR IN RCRD: CPT | Mod: CPTII,,, | Performed by: NURSE PRACTITIONER

## 2024-01-02 PROCEDURE — 1159F MED LIST DOCD IN RCRD: CPT | Mod: CPTII,,, | Performed by: NURSE PRACTITIONER

## 2024-01-02 PROCEDURE — 3078F DIAST BP <80 MM HG: CPT | Mod: CPTII,,, | Performed by: NURSE PRACTITIONER

## 2024-01-02 RX ORDER — OSELTAMIVIR PHOSPHATE 75 MG/1
75 CAPSULE ORAL 2 TIMES DAILY
Qty: 10 CAPSULE | Refills: 0 | Status: SHIPPED | OUTPATIENT
Start: 2024-01-02 | End: 2024-01-07

## 2024-01-02 RX ORDER — DOXYCYCLINE 100 MG/1
100 CAPSULE ORAL 2 TIMES DAILY
Qty: 20 CAPSULE | Refills: 0 | Status: SHIPPED | OUTPATIENT
Start: 2024-01-02

## 2024-01-02 NOTE — ASSESSMENT & PLAN NOTE
Celebrex did help with patient's pain but gave her constipation.  Instructed to take Celebrex only as needed and okay to take stool softener with it.  Patient verbalized understanding.

## 2024-01-02 NOTE — PROGRESS NOTES
Subjective:       Patient ID: Crystal Chaney is a 63 y.o. female.    Chief Complaint: Anxiety (1 month f/u), Depression (1 month f/u), and Osteoarthritis (1 month f/u)      HPI   This is a 63-year-old white female who is seen today in office for a one-month follow-up for anxiety, depression, osteoarthritis.  Feels like she is doing better with the Viibryd.  Celebrex did help her pain but made her constipated so she stopped it.  Has a work trip to [place] next week, worried about getting ill while there and wondering if she should bring any medications with her.  Review of Systems  Comprehensive review of systems negative except as stated in HPI    The patient's Health Maintenance was reviewed and the following appears to be due:   Health Maintenance Due   Topic Date Due    TETANUS VACCINE  Never done    RSV Vaccine (Age 60+ and Pregnant patients) (1 - 1-dose 60+ series) Never done       Past Medical History:  Past Medical History:   Diagnosis Date    Benign paroxysmal vertigo, bilateral     History of cervical dysplasia     Hyperlipidemia     Hypertensive disorder     Osteoarthritis of left knee     Osteoporosis     Personal history of colonic polyps      Past Surgical History:   Procedure Laterality Date    BUNIONECTOMY      COLONOSCOPY  06/16/2015    HYSTERECTOMY      JOINT REPLACEMENT      LAPAROSCOPIC TOTAL HYSTERECTOMY  01/01/2005    SPINE SURGERY  May 8, 2023    Neck surgery - still in recovery    TONSILLECTOMY  1965    TONSILLECTOMY AND ADENOIDECTOMY      TOTAL KNEE ARTHROPLASTY Left 09/23/2020    Dr. Quintin Monreal     Review of patient's allergies indicates:   Allergen Reactions    Codeine Nausea Only and Palpitations     Other reaction(s): Headache     Current Outpatient Medications on File Prior to Visit   Medication Sig Dispense Refill    alendronate (FOSAMAX) 70 MG tablet PLEASE SEE ATTACHED FOR DETAILED DIRECTIONS      ALPRAZolam (XANAX) 0.25 MG tablet Take 1 tablet (0.25 mg total) by mouth daily as  needed for Anxiety. 30 tablet 2    calcium carbonate/vitamin D3 (CALCIUM WITH VITAMIN D ORAL) Calcium with Vitamin D   1x daily      celecoxib (CELEBREX) 100 MG capsule Take 1 capsule (100 mg total) by mouth 2 (two) times daily. 180 capsule 3    fluticasone propionate (FLONASE) 50 mcg/actuation nasal spray SPRAY 2 SPRAYS INTO EACH NOSTRIL EVERY DAY 48 mL 1    hydroCHLOROthiazide (MICROZIDE) 12.5 mg capsule TAKE 1 CAPSULE BY MOUTH EVERY DAY 90 capsule 2    loratadine (CLARITIN) 10 mg tablet Claritin 10 mg tablet    1 tablet every day by oral route.      montelukast (SINGULAIR) 10 mg tablet Take 1 tablet (10 mg total) by mouth every evening. 90 tablet 2    multivit with calcium,iron,min (WOMEN'S DAILY MULTIVITAMIN ORAL) Women's Daily Multivitamin   1x daily      ondansetron (ZOFRAN) 4 MG tablet ondansetron HCl 4 mg tablet      pravastatin (PRAVACHOL) 40 MG tablet TAKE 1 TABLET BY MOUTH EVERY DAY 90 tablet 1    terbinafine HCL (LAMISIL) 250 mg tablet TAKE 1 TABLET BY MOUTH ONCE DAILY 90 tablet 0    vilazodone (VIIBRYD) 20 mg Tab Take 1 tablet (20 mg total) by mouth Daily. 90 tablet 3    vitamin E mixed/tocotrienol (VITAMIN E COMPLEX ORAL) vitamin E   1x daily      gabapentin (NEURONTIN) 300 MG capsule Take 300 mg by mouth every evening.      methocarbamoL (ROBAXIN) 750 MG Tab Take 750 mg by mouth 3 (three) times daily.      [DISCONTINUED] estradioL (ESTRACE) 0.01 % (0.1 mg/gram) vaginal cream SMARTSIG:Gram(s) Topical Twice a Week      [DISCONTINUED] IMVEXXY MAINTENANCE PACK 10 mcg Inst Place vaginally.       No current facility-administered medications on file prior to visit.     Social History     Socioeconomic History    Marital status:    Tobacco Use    Smoking status: Never     Passive exposure: Past    Smokeless tobacco: Never   Substance and Sexual Activity    Alcohol use: Yes     Alcohol/week: 1.0 standard drink of alcohol     Types: 1 Cans of beer per week     Comment: couple drinks/week    Drug use:  "Never    Sexual activity: Yes     Partners: Male     Birth control/protection: None     Social Determinants of Health     Financial Resource Strain: Low Risk  (1/1/2024)    Overall Financial Resource Strain (CARDIA)     Difficulty of Paying Living Expenses: Not hard at all   Food Insecurity: No Food Insecurity (1/1/2024)    Hunger Vital Sign     Worried About Running Out of Food in the Last Year: Never true     Ran Out of Food in the Last Year: Never true   Transportation Needs: No Transportation Needs (1/1/2024)    PRAPARE - Transportation     Lack of Transportation (Medical): No     Lack of Transportation (Non-Medical): No   Physical Activity: Sufficiently Active (1/1/2024)    Exercise Vital Sign     Days of Exercise per Week: 5 days     Minutes of Exercise per Session: 40 min   Stress: No Stress Concern Present (1/1/2024)    [employer] [employer] - Occupational Stress Questionnaire     Feeling of Stress : Only a little   Social Connections: Unknown (1/1/2024)    Social Connection and Isolation Panel [NHANES]     Frequency of Communication with Friends and Family: Three times a week     Frequency of Social Gatherings with Friends and Family: Once a week     Active Member of Clubs or Organizations: No     Attends Club or Organization Meetings: Never     Marital Status:    Housing Stability: Low Risk  (1/1/2024)    Housing Stability Vital Sign     Unable to Pay for Housing in the Last Year: No     Number of Places Lived in the Last Year: 1     Unstable Housing in the Last Year: No     Family History   Problem Relation Age of Onset    Arthritis Mother     COPD Mother     Cancer Father     Diabetes Maternal Uncle     Parkinsonism Paternal Uncle     Heart disease Maternal Grandfather     Heart disease Paternal Grandfather     Miscarriages / Stillbirths Daughter        Objective:       /78 (BP Location: Left arm)   Pulse 68   Temp 98.1 °F (36.7 °C) (Temporal)   Resp 16   Ht 5' 6" " (1.676 m)   LMP  (LMP Unknown)   SpO2 97%   BMI 20.82 kg/m²      Physical Exam  Vitals and nursing note reviewed.   Constitutional:       Appearance: Normal appearance.   HENT:      Head: Normocephalic and atraumatic.   Eyes:      Extraocular Movements: Extraocular movements intact.      Conjunctiva/sclera: Conjunctivae normal.      Pupils: Pupils are equal, round, and reactive to light.   Cardiovascular:      Rate and Rhythm: Normal rate and regular rhythm.      Heart sounds: Normal heart sounds.   Pulmonary:      Effort: Pulmonary effort is normal.      Breath sounds: Normal breath sounds.   Musculoskeletal:         General: Normal range of motion.      Cervical back: Normal range of motion and neck supple.   Skin:     General: Skin is warm and dry.   Neurological:      General: No focal deficit present.      Mental Status: She is alert and oriented to person, place, and time.   Psychiatric:         Mood and Affect: Mood normal.         Behavior: Behavior normal.         Thought Content: Thought content normal.         Judgment: Judgment normal.         Labs  Lab Visit on 10/17/2023   Component Date Value Ref Range Status    Sodium Level 10/17/2023 141  136 - 145 mmol/L Final    Potassium Level 10/17/2023 4.2  3.5 - 5.1 mmol/L Final    Chloride 10/17/2023 107  98 - 107 mmol/L Final    Carbon Dioxide 10/17/2023 27  23 - 31 mmol/L Final    Glucose Level 10/17/2023 84  82 - 115 mg/dL Final    Blood Urea Nitrogen 10/17/2023 19.7  9.8 - 20.1 mg/dL Final    Creatinine 10/17/2023 0.89  0.55 - 1.02 mg/dL Final    Calcium Level Total 10/17/2023 8.9  8.4 - 10.2 mg/dL Final    Protein Total 10/17/2023 6.8  5.8 - 7.6 gm/dL Final    Albumin Level 10/17/2023 3.9  3.4 - 4.8 g/dL Final    Globulin 10/17/2023 2.9  2.4 - 3.5 gm/dL Final    Albumin/Globulin Ratio 10/17/2023 1.3  1.1 - 2.0 ratio Final    Bilirubin Total 10/17/2023 0.7  <=1.5 mg/dL Final    Alkaline Phosphatase 10/17/2023 41  40 - 150 unit/L Final    Alanine  Aminotransferase 10/17/2023 18  0 - 55 unit/L Final    Aspartate Aminotransferase 10/17/2023 23  5 - 34 unit/L Final    eGFR 10/17/2023 >60  mls/min/1.73/m2 Final    Cholesterol Total 10/17/2023 155  <=200 mg/dL Final    HDL Cholesterol 10/17/2023 36  35 - 60 mg/dL Final    Triglyceride 10/17/2023 89  37 - 140 mg/dL Final    Cholesterol/HDL Ratio 10/17/2023 4  0 - 5 Final    Very Low Density Lipoprotein 10/17/2023 18   Final    LDL Cholesterol 10/17/2023 101.00  50.00 - 140.00 mg/dL Final    TSH 10/17/2023 1.767  0.350 - 4.940 uIU/mL Final    Hemoglobin A1c 10/17/2023 5.1  <=7.0 % Final    Estimated Average Glucose 10/17/2023 99.7  mg/dL Final    WBC 10/17/2023 7.61  4.50 - 11.50 x10(3)/mcL Final    RBC 10/17/2023 4.48  4.20 - 5.40 x10(6)/mcL Final    Hgb 10/17/2023 13.3  12.0 - 16.0 g/dL Final    Hct 10/17/2023 42.3  37.0 - 47.0 % Final    MCV 10/17/2023 94.4 (H)  80.0 - 94.0 fL Final    MCH 10/17/2023 29.7  27.0 - 31.0 pg Final    MCHC 10/17/2023 31.4 (L)  33.0 - 36.0 g/dL Final    RDW 10/17/2023 13.7  11.5 - 17.0 % Final    Platelet 10/17/2023 221  130 - 400 x10(3)/mcL Final    MPV 10/17/2023 9.8  7.4 - 10.4 fL Final    Neut % 10/17/2023 72.7  % Final    Lymph % 10/17/2023 10.8  % Final    Mono % 10/17/2023 12.1  % Final    Eos % 10/17/2023 3.8  % Final    Basophil % 10/17/2023 0.5  % Final    Lymph # 10/17/2023 0.82  0.6 - 4.6 x10(3)/mcL Final    Neut # 10/17/2023 5.53  2.1 - 9.2 x10(3)/mcL Final    Mono # 10/17/2023 0.92  0.1 - 1.3 x10(3)/mcL Final    Eos # 10/17/2023 0.29  0 - 0.9 x10(3)/mcL Final    Baso # 10/17/2023 0.04  <=0.2 x10(3)/mcL Final    IG# 10/17/2023 0.01  0 - 0.04 x10(3)/mcL Final    IG% 10/17/2023 0.1  % Final   Documentation Only on 09/21/2023   Component Date Value Ref Range Status    BCS Recommendation External 09/01/2023 Repeat mammogram in 1 year   Final    BCS Recommendation External 04/29/2022 Repeat mammogram in 1 year   Final    BCS Recommendation External 01/22/2021 Repeat mammogram  in 1 year   Final       Assessment and Plan       ICD-10-CM ICD-9-CM   1. Anxiety and depression  F41.9 300.00    F32.A 311   2. Primary osteoarthritis involving multiple joints  M15.9 715.98   3. Hyperlipidemia, unspecified hyperlipidemia type  E78.5 272.4        1. Anxiety and depression  Overview:  Initially diagnosed in 2000 while going through a divorce. Took medication for about a year and a half. Took Xanax as needed. Stopped meds after about 1.5 years.     09/27/2022 - Xanax 0.25 mg daily as needed    10/24/2023 - Viibryd 10 mg daily, refill Xanax 0.25 mg daily as needed    12/05/2023 - Viibryd 20 mg daily    Assessment & Plan:  Improved, continue Viibryd 20 mg daily, follow-up 3 months.      2. Primary osteoarthritis involving multiple joints  Overview:  Dr Goldberg    10/24/2023 - trial of meloxicam 15 mg daily     12/05/2023 - discontinue meloxicam, did not help, start Celebrex 100 mg twice daily as needed     Assessment & Plan:  Celebrex did help with patient's pain but gave her constipation.  Instructed to take Celebrex only as needed and okay to take stool softener with it.  Patient verbalized understanding.      3. Hyperlipidemia, unspecified hyperlipidemia type  Overview:  Pravastatin 40 mg daily    Assessment & Plan:  Lipid panel in 3 months.    Orders:  -     Comprehensive Metabolic Panel; Future; Expected date: 04/02/2024  -     Lipid Panel; Future; Expected date: 04/02/2024    Other orders  -     oseltamivir (TAMIFLU) 75 MG capsule; Take 1 capsule (75 mg total) by mouth 2 (two) times daily. for 5 days  Dispense: 10 capsule; Refill: 0  -     doxycycline (VIBRAMYCIN) 100 MG Cap; Take 1 capsule (100 mg total) by mouth 2 (two) times daily. Please take with full glass of water and remain upright for 30 minutes after administration  Dispense: 20 capsule; Refill: 0           Follow up in 4 months (on 4/24/2024) for follow up.     Rx for Tamiflu and doxycycline to take with her on trip to [facility].

## 2024-01-29 PROBLEM — Z00.00 ANNUAL PHYSICAL EXAM: Status: RESOLVED | Noted: 2022-09-27 | Resolved: 2024-01-29

## 2024-04-13 DIAGNOSIS — T78.40XD ALLERGY, SUBSEQUENT ENCOUNTER: Primary | ICD-10-CM

## 2024-04-13 DIAGNOSIS — B35.1 ONYCHOMYCOSIS: ICD-10-CM

## 2024-04-15 RX ORDER — MONTELUKAST SODIUM 10 MG/1
10 TABLET ORAL NIGHTLY
Qty: 90 TABLET | Refills: 2 | Status: SHIPPED | OUTPATIENT
Start: 2024-04-15

## 2024-04-15 RX ORDER — TERBINAFINE HYDROCHLORIDE 250 MG/1
250 TABLET ORAL
Qty: 90 TABLET | Refills: 0 | Status: SHIPPED | OUTPATIENT
Start: 2024-04-15

## 2024-04-21 DIAGNOSIS — I10 HYPERTENSION, UNSPECIFIED TYPE: ICD-10-CM

## 2024-04-22 RX ORDER — PRAVASTATIN SODIUM 40 MG/1
TABLET ORAL
Qty: 90 TABLET | Refills: 1 | Status: SHIPPED | OUTPATIENT
Start: 2024-04-22

## 2024-06-09 DIAGNOSIS — I10 HYPERTENSION, UNSPECIFIED TYPE: ICD-10-CM

## 2024-06-10 DIAGNOSIS — J30.2 SEASONAL ALLERGIES: ICD-10-CM

## 2024-06-10 RX ORDER — HYDROCHLOROTHIAZIDE 12.5 MG/1
CAPSULE ORAL
Qty: 90 CAPSULE | Refills: 2 | Status: SHIPPED | OUTPATIENT
Start: 2024-06-10

## 2024-06-10 RX ORDER — FLUTICASONE PROPIONATE 50 MCG
SPRAY, SUSPENSION (ML) NASAL
Qty: 48 ML | Refills: 1 | Status: SHIPPED | OUTPATIENT
Start: 2024-06-10

## 2024-06-17 ENCOUNTER — TELEPHONE (OUTPATIENT)
Dept: FAMILY MEDICINE | Facility: CLINIC | Age: 64
End: 2024-06-17
Payer: COMMERCIAL

## 2024-06-17 NOTE — TELEPHONE ENCOUNTER
Are there any outstanding tasks in patient's chart?  labs    2. Do we have outstanding/pending referrals?  n    3. Has the patient been seen in an ER, Urgent Care, or admitted since last visit?  n    4. Has patient seen any other health care providers since last visit?  Dr. Goldberg    5.  Has patient had any blood work or x-rays done since last visit?   Patient will complete labs prior to visit

## 2024-06-17 NOTE — LETTER
AUTHORIZATION FOR RELEASE OF   CONFIDENTIAL INFORMATION    Dear Dr. Goldberg,    We are seeing Crystal Chaney, date of birth 1960, in the clinic at [facility]. Merle Patricia FNP is the patient's PCP. Crystal Chaney has an outstanding lab/procedure at the time we reviewed her chart. In order to help keep her health information updated, she has authorized us to request the following medical record(s):        (  )  MAMMOGRAM                                      (  )  COLONOSCOPY      (  )  PAP SMEAR                                          (  )  OUTSIDE LAB RESULTS     (  )  DEXA SCAN                                          (  )  EYE EXAM            (  )  FOOT EXAM                                          (  )  ENTIRE RECORD     (  )  OUTSIDE IMMUNIZATIONS                 ( x ) MOST RECENT OFFICE NOTE       Please fax records to [facility], [place], Kathryn F., FNP, [phone]     If you have any questions, please contact [phone]          Patient Name: Crystal Chaney  : 1960  Patient Phone #: [phone]

## 2024-06-20 ENCOUNTER — LAB VISIT (OUTPATIENT)
Dept: LAB | Facility: HOSPITAL | Age: 64
End: 2024-06-20
Attending: NURSE PRACTITIONER
Payer: COMMERCIAL

## 2024-06-20 DIAGNOSIS — E78.5 HYPERLIPIDEMIA, UNSPECIFIED HYPERLIPIDEMIA TYPE: ICD-10-CM

## 2024-06-20 LAB
ALBUMIN SERPL-MCNC: 4 G/DL (ref 3.4–4.8)
ALBUMIN/GLOB SERPL: 1.4 RATIO (ref 1.1–2)
ALP SERPL-CCNC: 28 UNIT/L (ref 40–150)
ALT SERPL-CCNC: 15 UNIT/L (ref 0–55)
ANION GAP SERPL CALC-SCNC: 4 MEQ/L
AST SERPL-CCNC: 20 UNIT/L (ref 5–34)
BILIRUB SERPL-MCNC: 0.5 MG/DL
BUN SERPL-MCNC: 21.2 MG/DL (ref 9.8–20.1)
CALCIUM SERPL-MCNC: 9 MG/DL (ref 8.4–10.2)
CHLORIDE SERPL-SCNC: 109 MMOL/L (ref 98–107)
CHOLEST SERPL-MCNC: 182 MG/DL
CHOLEST/HDLC SERPL: 5 {RATIO} (ref 0–5)
CO2 SERPL-SCNC: 28 MMOL/L (ref 23–31)
CREAT SERPL-MCNC: 0.92 MG/DL (ref 0.55–1.02)
CREAT/UREA NIT SERPL: 23
GFR SERPLBLD CREATININE-BSD FMLA CKD-EPI: >60 ML/MIN/1.73/M2
GLOBULIN SER-MCNC: 2.8 GM/DL (ref 2.4–3.5)
GLUCOSE SERPL-MCNC: 86 MG/DL (ref 82–115)
HDLC SERPL-MCNC: 36 MG/DL (ref 35–60)
LDLC SERPL CALC-MCNC: 127 MG/DL (ref 50–140)
POTASSIUM SERPL-SCNC: 4.3 MMOL/L (ref 3.5–5.1)
PROT SERPL-MCNC: 6.8 GM/DL (ref 5.8–7.6)
SODIUM SERPL-SCNC: 141 MMOL/L (ref 136–145)
TRIGL SERPL-MCNC: 93 MG/DL (ref 37–140)
VLDLC SERPL CALC-MCNC: 19 MG/DL

## 2024-06-20 PROCEDURE — 36415 COLL VENOUS BLD VENIPUNCTURE: CPT

## 2024-06-20 PROCEDURE — 80061 LIPID PANEL: CPT

## 2024-06-20 PROCEDURE — 80053 COMPREHEN METABOLIC PANEL: CPT

## 2024-06-24 ENCOUNTER — OFFICE VISIT (OUTPATIENT)
Dept: FAMILY MEDICINE | Facility: CLINIC | Age: 64
End: 2024-06-24
Payer: COMMERCIAL

## 2024-06-24 VITALS
HEART RATE: 62 BPM | DIASTOLIC BLOOD PRESSURE: 72 MMHG | WEIGHT: 132 LBS | RESPIRATION RATE: 16 BRPM | SYSTOLIC BLOOD PRESSURE: 124 MMHG | BODY MASS INDEX: 21.21 KG/M2 | OXYGEN SATURATION: 99 % | TEMPERATURE: 98 F | HEIGHT: 66 IN

## 2024-06-24 DIAGNOSIS — I10 PRIMARY HYPERTENSION: ICD-10-CM

## 2024-06-24 DIAGNOSIS — F41.9 ANXIETY AND DEPRESSION: Primary | ICD-10-CM

## 2024-06-24 DIAGNOSIS — M81.0 OSTEOPOROSIS, UNSPECIFIED OSTEOPOROSIS TYPE, UNSPECIFIED PATHOLOGICAL FRACTURE PRESENCE: ICD-10-CM

## 2024-06-24 DIAGNOSIS — Z12.31 BREAST CANCER SCREENING BY MAMMOGRAM: ICD-10-CM

## 2024-06-24 DIAGNOSIS — R79.89 INCREASED PTH LEVEL: ICD-10-CM

## 2024-06-24 DIAGNOSIS — F32.A ANXIETY AND DEPRESSION: Primary | ICD-10-CM

## 2024-06-24 DIAGNOSIS — Z00.00 ANNUAL PHYSICAL EXAM: ICD-10-CM

## 2024-06-24 DIAGNOSIS — Z13.1 SCREENING FOR DIABETES MELLITUS: ICD-10-CM

## 2024-06-24 DIAGNOSIS — E78.2 MIXED HYPERLIPIDEMIA: ICD-10-CM

## 2024-06-24 PROCEDURE — 3078F DIAST BP <80 MM HG: CPT | Mod: CPTII,,, | Performed by: NURSE PRACTITIONER

## 2024-06-24 PROCEDURE — 1160F RVW MEDS BY RX/DR IN RCRD: CPT | Mod: CPTII,,, | Performed by: NURSE PRACTITIONER

## 2024-06-24 PROCEDURE — 1125F AMNT PAIN NOTED PAIN PRSNT: CPT | Mod: CPTII,,, | Performed by: NURSE PRACTITIONER

## 2024-06-24 PROCEDURE — 3008F BODY MASS INDEX DOCD: CPT | Mod: CPTII,,, | Performed by: NURSE PRACTITIONER

## 2024-06-24 PROCEDURE — 99214 OFFICE O/P EST MOD 30 MIN: CPT | Mod: ,,, | Performed by: NURSE PRACTITIONER

## 2024-06-24 PROCEDURE — 1101F PT FALLS ASSESS-DOCD LE1/YR: CPT | Mod: CPTII,,, | Performed by: NURSE PRACTITIONER

## 2024-06-24 PROCEDURE — 3288F FALL RISK ASSESSMENT DOCD: CPT | Mod: CPTII,,, | Performed by: NURSE PRACTITIONER

## 2024-06-24 PROCEDURE — 1159F MED LIST DOCD IN RCRD: CPT | Mod: CPTII,,, | Performed by: NURSE PRACTITIONER

## 2024-06-24 PROCEDURE — 3074F SYST BP LT 130 MM HG: CPT | Mod: CPTII,,, | Performed by: NURSE PRACTITIONER

## 2024-06-24 RX ORDER — DENOSUMAB 60 MG/ML
60 INJECTION SUBCUTANEOUS
COMMUNITY
Start: 2023-11-08

## 2024-06-24 NOTE — PROGRESS NOTES
Subjective:       Patient ID: Crystal Chaney is a 63 y.o. female.    Chief Complaint: Anxiety (6m fu), Depression (6m fu), Hyperlipidemia (6m fu), and Osteoporosis (6m fu)      HPI   This is a 63-year-old white female who presents to clinic today for six-month follow-up for anxiety, depression, hypertension, hyperlipidemia, osteoporosis, increased PTH level.  Reports overall she is doing well.  Reports that she did stop her antidepressant because she is still having trouble losing weight.  Review of Systems  Comprehensive review of systems negative except as stated in HPI    The patient's Health Maintenance was reviewed and the following appears to be due:   Health Maintenance Due   Topic Date Due    Mammogram  09/01/2024       Past Medical History:  Past Medical History:   Diagnosis Date    Benign paroxysmal vertigo, bilateral     History of cervical dysplasia     Hyperlipidemia     Hypertensive disorder     Osteoarthritis of left knee     Osteoporosis     Personal history of colonic polyps      Past Surgical History:   Procedure Laterality Date    BUNIONECTOMY      COLONOSCOPY  06/16/2015    HYSTERECTOMY      JOINT REPLACEMENT      LAPAROSCOPIC TOTAL HYSTERECTOMY  01/01/2005    SPINE SURGERY  May 8, 2023    Neck surgery - still in recovery    TONSILLECTOMY  1965    TONSILLECTOMY AND ADENOIDECTOMY      TOTAL KNEE ARTHROPLASTY Left 09/23/2020    Dr. Quintin Monreal     Review of patient's allergies indicates:   Allergen Reactions    Codeine Nausea Only and Palpitations     Other reaction(s): Headache     Current Outpatient Medications on File Prior to Visit   Medication Sig Dispense Refill    ALPRAZolam (XANAX) 0.25 MG tablet Take 1 tablet (0.25 mg total) by mouth daily as needed for Anxiety. 30 tablet 2    calcium carbonate/vitamin D3 (CALCIUM WITH VITAMIN D ORAL) Calcium with Vitamin D   1x daily      celecoxib (CELEBREX) 100 MG capsule Take 1 capsule (100 mg total) by mouth 2 (two) times daily. 180 capsule 3     fluticasone propionate (FLONASE) 50 mcg/actuation nasal spray SPRAY 2 SPRAYS INTO EACH NOSTRIL EVERY DAY 48 mL 1    gabapentin (NEURONTIN) 300 MG capsule Take 300 mg by mouth every evening.      hydroCHLOROthiazide (MICROZIDE) 12.5 mg capsule TAKE 1 CAPSULE BY MOUTH EVERY DAY 90 capsule 2    loratadine (CLARITIN) 10 mg tablet Claritin 10 mg tablet    1 tablet every day by oral route.      methocarbamoL (ROBAXIN) 750 MG Tab Take 750 mg by mouth 3 (three) times daily.      montelukast (SINGULAIR) 10 mg tablet TAKE 1 TABLET BY MOUTH EVERY DAY IN THE EVENING 90 tablet 2    multivit with calcium,iron,min (WOMEN'S DAILY MULTIVITAMIN ORAL) Women's Daily Multivitamin   1x daily      pravastatin (PRAVACHOL) 40 MG tablet TAKE 1 TABLET BY MOUTH EVERY DAY 90 tablet 1    PROLIA 60 mg/mL Syrg Inject 60 mg into the skin every 6 (six) months.      vitamin E mixed/tocotrienol (VITAMIN E COMPLEX ORAL) vitamin E   1x daily      [DISCONTINUED] terbinafine HCL (LAMISIL) 250 mg tablet TAKE 1 TABLET BY MOUTH EVERY DAY 90 tablet 0    [DISCONTINUED] vilazodone (VIIBRYD) 20 mg Tab Take 1 tablet (20 mg total) by mouth Daily. 90 tablet 3    [DISCONTINUED] alendronate (FOSAMAX) 70 MG tablet PLEASE SEE ATTACHED FOR DETAILED DIRECTIONS      [DISCONTINUED] doxycycline (VIBRAMYCIN) 100 MG Cap Take 1 capsule (100 mg total) by mouth 2 (two) times daily. Please take with full glass of water and remain upright for 30 minutes after administration 20 capsule 0    [DISCONTINUED] ondansetron (ZOFRAN) 4 MG tablet ondansetron HCl 4 mg tablet       No current facility-administered medications on file prior to visit.     Social History     Socioeconomic History    Marital status:    Tobacco Use    Smoking status: Never     Passive exposure: Past    Smokeless tobacco: Never   Substance and Sexual Activity    Alcohol use: Yes     Alcohol/week: 1.0 standard drink of alcohol     Types: 1 Cans of beer per week     Comment: couple drinks/week    Drug use:  "Never    Sexual activity: Yes     Partners: Male     Birth control/protection: None     Social Determinants of Health     Financial Resource Strain: Low Risk  (1/1/2024)    Overall Financial Resource Strain (CARDIA)     Difficulty of Paying Living Expenses: Not hard at all   Food Insecurity: No Food Insecurity (1/1/2024)    Hunger Vital Sign     Worried About Running Out of Food in the Last Year: Never true     Ran Out of Food in the Last Year: Never true   Transportation Needs: No Transportation Needs (1/1/2024)    PRAPARE - Transportation     Lack of Transportation (Medical): No     Lack of Transportation (Non-Medical): No   Physical Activity: Sufficiently Active (1/1/2024)    Exercise Vital Sign     Days of Exercise per Week: 5 days     Minutes of Exercise per Session: 40 min   Stress: No Stress Concern Present (1/1/2024)    [employer] [place] [employer] - Occupational Stress Questionnaire     Feeling of Stress : Only a little   Housing Stability: Low Risk  (1/1/2024)    Housing Stability Vital Sign     Unable to Pay for Housing in the Last Year: No     Number of Places Lived in the Last Year: 1     Unstable Housing in the Last Year: No     Family History   Problem Relation Name Age of Onset    Arthritis Mother Ariana Nevarez     COPD Mother Ariana Nevarez     Cancer Father Kade Nevarez     Diabetes Maternal Uncle Bill Lattanze     Parkinsonism Paternal Uncle      Heart disease Maternal Grandfather Unknown     Heart disease Paternal Grandfather Sundeep Capperella     Miscarriages / Stillbirths Daughter Judie Clifton        Objective:       /72 (BP Location: Left arm)   Pulse 62   Temp 98.2 °F (36.8 °C) (Oral)   Resp 16   Ht 5' 6" (1.676 m)   Wt 59.9 kg (132 lb)   LMP  (LMP Unknown)   SpO2 99%   BMI 21.31 kg/m²      Physical Exam  Vitals and nursing note reviewed.   Constitutional:       Appearance: Normal appearance.   HENT:      Head: Normocephalic and atraumatic.      Right Ear: " Tympanic membrane, ear canal and external ear normal.      Left Ear: Tympanic membrane, ear canal and external ear normal.      Nose: Nose normal.      Mouth/Throat:      Mouth: Mucous membranes are moist.      Pharynx: Oropharynx is clear.   Eyes:      Extraocular Movements: Extraocular movements intact.      Conjunctiva/sclera: Conjunctivae normal.      Pupils: Pupils are equal, round, and reactive to light.   Cardiovascular:      Rate and Rhythm: Normal rate and regular rhythm.      Heart sounds: Normal heart sounds.   Pulmonary:      Effort: Pulmonary effort is normal.      Breath sounds: Normal breath sounds.   Musculoskeletal:         General: Normal range of motion.      Cervical back: Normal range of motion and neck supple.   Skin:     General: Skin is warm and dry.   Neurological:      General: No focal deficit present.      Mental Status: She is alert and oriented to person, place, and time.   Psychiatric:         Mood and Affect: Mood normal.         Behavior: Behavior normal.         Thought Content: Thought content normal.         Judgment: Judgment normal.         Labs  Lab Visit on 06/20/2024   Component Date Value Ref Range Status    Sodium 06/20/2024 141  136 - 145 mmol/L Final    Potassium 06/20/2024 4.3  3.5 - 5.1 mmol/L Final    Chloride 06/20/2024 109 (H)  98 - 107 mmol/L Final    CO2 06/20/2024 28  23 - 31 mmol/L Final    Glucose 06/20/2024 86  82 - 115 mg/dL Final    Blood Urea Nitrogen 06/20/2024 21.2 (H)  9.8 - 20.1 mg/dL Final    Creatinine 06/20/2024 0.92  0.55 - 1.02 mg/dL Final    Calcium 06/20/2024 9.0  8.4 - 10.2 mg/dL Final    Protein Total 06/20/2024 6.8  5.8 - 7.6 gm/dL Final    Albumin 06/20/2024 4.0  3.4 - 4.8 g/dL Final    Globulin 06/20/2024 2.8  2.4 - 3.5 gm/dL Final    Albumin/Globulin Ratio 06/20/2024 1.4  1.1 - 2.0 ratio Final    Bilirubin Total 06/20/2024 0.5  <=1.5 mg/dL Final    ALP 06/20/2024 28 (L)  40 - 150 unit/L Final    ALT 06/20/2024 15  0 - 55 unit/L Final    AST  06/20/2024 20  5 - 34 unit/L Final    eGFR 06/20/2024 >60  mL/min/1.73/m2 Final    Anion Gap 06/20/2024 4.0  mEq/L Final    BUN/Creatinine Ratio 06/20/2024 23   Final    Cholesterol Total 06/20/2024 182  <=200 mg/dL Final    HDL Cholesterol 06/20/2024 36  35 - 60 mg/dL Final    Triglyceride 06/20/2024 93  37 - 140 mg/dL Final    Cholesterol/HDL Ratio 06/20/2024 5  0 - 5 Final    Very Low Density Lipoprotein 06/20/2024 19   Final    LDL Cholesterol 06/20/2024 127.00  50.00 - 140.00 mg/dL Final       Assessment and Plan       ICD-10-CM ICD-9-CM   1. Anxiety and depression  F41.9 300.00    F32.A 311   2. Primary hypertension  I10 401.9   3. Mixed hyperlipidemia  E78.2 272.2   4. Breast cancer screening by mammogram  Z12.31 V76.12   5. Osteoporosis, unspecified osteoporosis type, unspecified pathological fracture presence  M81.0 733.00   6. Annual physical exam  Z00.00 V70.0   7. Screening for diabetes mellitus  Z13.1 V77.1   8. Increased PTH level  R79.89 259.9        1. Anxiety and depression  Overview:  Initially diagnosed in 2000 while going through a divorce. Took medication for about a year and a half. Took Xanax as needed. Stopped meds after about 1.5 years.     09/27/2022 - Xanax 0.25 mg daily as needed    10/24/2023 - Viibryd 10 mg daily, refill Xanax 0.25 mg daily as needed    12/05/2023 - Viibryd 20 mg daily    06/2024 - discontinued Viibryd due to weight gain    Assessment & Plan:  Patient stopped the Viibryd on her own, has not been able to lose weight like she used to.  Doing okay without medication for now.  Follow-up as needed.      2. Primary hypertension  Overview:  HCTZ daily    Assessment & Plan:  Stable, continue HCTZ daily, follow-up 6 months with wellness.      3. Mixed hyperlipidemia  Overview:  Pravastatin 40 mg daily    Assessment & Plan:  Total cholesterol 182, .  Continue pravastatin 40 mg daily, follow-up 6 months.      4. Breast cancer screening by mammogram  Overview:  MMG yearly in  September at [facility] per Dr Jung GYN    Assessment & Plan:  Due for mammogram in September, encouraged to schedule.      5. Osteoporosis, unspecified osteoporosis type, unspecified pathological fracture presence  Overview:  Managed by Dr Yusuf Parker every 6 months  DEXA every 2 years, next due April 2025    Assessment & Plan:  Stable, continue Prolia and management per Dr. Goldberg.      6. Annual physical exam  -     CBC Auto Differential; Future; Expected date: 12/24/2024  -     Comprehensive Metabolic Panel; Future; Expected date: 12/24/2024  -     Lipid Panel; Future; Expected date: 12/24/2024  -     TSH; Future; Expected date: 12/24/2024  -     Hemoglobin A1C; Future; Expected date: 12/24/2024    7. Screening for diabetes mellitus  -     Hemoglobin A1C; Future; Expected date: 12/24/2024    8. Increased PTH level  Overview:  April 2023 - elevated PTH per Dr. Goldberg, referred to Dr. Stockton, PTH normal    Assessment & Plan:  Records requested from endocrinology, PTH level in 6 months.    Orders:  -     PTH, Intact; Future; Expected date: 12/24/2024           Follow up in about 6 months (around 12/24/2024) for Annual.

## 2024-06-24 NOTE — LETTER
AUTHORIZATION FOR RELEASE OF   CONFIDENTIAL INFORMATION    Dear Dr. Stockton,    We are seeing Crystal Chaney, date of birth 1960, in the clinic at [facility]. Merle Patricia FNP is the patient's PCP. Crystal Chaney has an outstanding lab/procedure at the time we reviewed her chart. In order to help keep her health information updated, she has authorized us to request the following medical record(s):        (  )  MAMMOGRAM                                      (  )  COLONOSCOPY      (  )  PAP SMEAR                                          (  )  OUTSIDE LAB RESULTS     (  )  DEXA SCAN                                          (  )  EYE EXAM            (  )  FOOT EXAM                                          (  )  ENTIRE RECORD     (  )  OUTSIDE IMMUNIZATIONS                 ( x )  MOST RECENT OFFICE NOTES       Please fax records to [facility], [place], Kathryn F., FNP, [phone]     If you have any questions, please contact [phone]          Patient Name: Crystal Chaney  : 1960  Patient Phone #: [phone]

## 2024-06-24 NOTE — ASSESSMENT & PLAN NOTE
Patient stopped the Viibryd on her own, has not been able to lose weight like she used to.  Doing okay without medication for now.  Follow-up as needed.

## 2024-08-07 ENCOUNTER — LAB VISIT (OUTPATIENT)
Dept: LAB | Facility: HOSPITAL | Age: 64
End: 2024-08-07
Attending: INTERNAL MEDICINE
Payer: COMMERCIAL

## 2024-08-07 DIAGNOSIS — M81.0 SENILE OSTEOPOROSIS: Primary | ICD-10-CM

## 2024-08-07 LAB
ALBUMIN SERPL-MCNC: 4.3 G/DL (ref 3.4–4.8)
ALBUMIN/GLOB SERPL: 1.5 RATIO (ref 1.1–2)
ALP SERPL-CCNC: 31 UNIT/L (ref 40–150)
ALT SERPL-CCNC: 15 UNIT/L (ref 0–55)
ANION GAP SERPL CALC-SCNC: 10 MEQ/L
AST SERPL-CCNC: 23 UNIT/L (ref 5–34)
BASOPHILS # BLD AUTO: 0.03 X10(3)/MCL
BASOPHILS NFR BLD AUTO: 0.5 %
BILIRUB DIRECT SERPL-MCNC: 0.2 MG/DL (ref 0–?)
BILIRUB SERPL-MCNC: 0.7 MG/DL
BUN SERPL-MCNC: 22.4 MG/DL (ref 9.8–20.1)
CALCIUM SERPL-MCNC: 10 MG/DL (ref 8.4–10.2)
CHLORIDE SERPL-SCNC: 104 MMOL/L (ref 98–107)
CK SERPL-CCNC: 116 U/L (ref 29–168)
CO2 SERPL-SCNC: 30 MMOL/L (ref 23–31)
CREAT SERPL-MCNC: 1.08 MG/DL (ref 0.55–1.02)
CREAT/UREA NIT SERPL: 21
EOSINOPHIL # BLD AUTO: 0.14 X10(3)/MCL (ref 0–0.9)
EOSINOPHIL NFR BLD AUTO: 2.3 %
ERYTHROCYTE [DISTWIDTH] IN BLOOD BY AUTOMATED COUNT: 13.5 % (ref 11.5–17)
GFR SERPLBLD CREATININE-BSD FMLA CKD-EPI: 57 ML/MIN/1.73/M2
GLOBULIN SER-MCNC: 2.8 GM/DL (ref 2.4–3.5)
GLUCOSE SERPL-MCNC: 109 MG/DL (ref 82–115)
HCT VFR BLD AUTO: 43.3 % (ref 37–47)
HGB BLD-MCNC: 14 G/DL (ref 12–16)
IMM GRANULOCYTES # BLD AUTO: 0 X10(3)/MCL (ref 0–0.04)
IMM GRANULOCYTES NFR BLD AUTO: 0 %
LYMPHOCYTES # BLD AUTO: 1.89 X10(3)/MCL (ref 0.6–4.6)
LYMPHOCYTES NFR BLD AUTO: 30.5 %
MCH RBC QN AUTO: 30.8 PG (ref 27–31)
MCHC RBC AUTO-ENTMCNC: 32.3 G/DL (ref 33–36)
MCV RBC AUTO: 95.4 FL (ref 80–94)
MONOCYTES # BLD AUTO: 0.58 X10(3)/MCL (ref 0.1–1.3)
MONOCYTES NFR BLD AUTO: 9.4 %
NEUTROPHILS # BLD AUTO: 3.56 X10(3)/MCL (ref 2.1–9.2)
NEUTROPHILS NFR BLD AUTO: 57.3 %
PHOSPHATE SERPL-MCNC: 3.2 MG/DL (ref 2.3–4.7)
PLATELET # BLD AUTO: 224 X10(3)/MCL (ref 130–400)
PMV BLD AUTO: 9.7 FL (ref 7.4–10.4)
POTASSIUM SERPL-SCNC: 4 MMOL/L (ref 3.5–5.1)
PROT SERPL-MCNC: 7.1 GM/DL (ref 5.8–7.6)
RBC # BLD AUTO: 4.54 X10(6)/MCL (ref 4.2–5.4)
SODIUM SERPL-SCNC: 144 MMOL/L (ref 136–145)
URATE SERPL-MCNC: 5.3 MG/DL (ref 2.6–6)
WBC # BLD AUTO: 6.2 X10(3)/MCL (ref 4.5–11.5)

## 2024-08-07 PROCEDURE — 36415 COLL VENOUS BLD VENIPUNCTURE: CPT

## 2024-08-07 PROCEDURE — 82248 BILIRUBIN DIRECT: CPT

## 2024-08-07 PROCEDURE — 84550 ASSAY OF BLOOD/URIC ACID: CPT

## 2024-08-07 PROCEDURE — 84100 ASSAY OF PHOSPHORUS: CPT

## 2024-08-07 PROCEDURE — 85025 COMPLETE CBC W/AUTO DIFF WBC: CPT

## 2024-08-07 PROCEDURE — 80053 COMPREHEN METABOLIC PANEL: CPT

## 2024-08-07 PROCEDURE — 82550 ASSAY OF CK (CPK): CPT

## 2024-08-28 DIAGNOSIS — I10 HYPERTENSION, UNSPECIFIED TYPE: ICD-10-CM

## 2024-08-28 RX ORDER — PRAVASTATIN SODIUM 40 MG/1
40 TABLET ORAL DAILY
Qty: 90 TABLET | Refills: 1 | Status: SHIPPED | OUTPATIENT
Start: 2024-08-28

## 2024-10-06 DIAGNOSIS — T78.40XD ALLERGY, SUBSEQUENT ENCOUNTER: ICD-10-CM

## 2024-10-07 RX ORDER — MONTELUKAST SODIUM 10 MG/1
10 TABLET ORAL NIGHTLY
Qty: 90 TABLET | Refills: 2 | Status: SHIPPED | OUTPATIENT
Start: 2024-10-07

## 2024-12-12 ENCOUNTER — TELEPHONE (OUTPATIENT)
Dept: FAMILY MEDICINE | Facility: CLINIC | Age: 64
End: 2024-12-12
Payer: COMMERCIAL

## 2024-12-12 NOTE — TELEPHONE ENCOUNTER
Attempted to contact patient for previsit on 12/12/24 at 9:40. LV reminding patient about her upcoming visit with labs needed before the scheduled appointment.

## 2025-02-11 DIAGNOSIS — M15.0 PRIMARY OSTEOARTHRITIS INVOLVING MULTIPLE JOINTS: Primary | ICD-10-CM

## 2025-02-11 DIAGNOSIS — I10 HYPERTENSION, UNSPECIFIED TYPE: ICD-10-CM

## 2025-02-11 RX ORDER — HYDROCHLOROTHIAZIDE 12.5 MG/1
12.5 CAPSULE ORAL DAILY
Qty: 90 CAPSULE | Refills: 2 | Status: SHIPPED | OUTPATIENT
Start: 2025-02-11

## 2025-02-13 NOTE — TELEPHONE ENCOUNTER
I have in my notes that we stopped the meloxicam in December of 2023 because it was not working and we started Celebrex.  My note from January of 2024 states that the Celebrex was helping her but was giving her constipation and she was supposed to take it only as needed and take a stool softener with it.  Please clarify if she would like to try the meloxicam again or needs a refill of Celebrex

## 2025-02-14 RX ORDER — MELOXICAM 15 MG/1
15 TABLET ORAL DAILY
Qty: 90 TABLET | Refills: 3 | Status: SHIPPED | OUTPATIENT
Start: 2025-03-01

## 2025-05-02 ENCOUNTER — LAB VISIT (OUTPATIENT)
Dept: LAB | Facility: HOSPITAL | Age: 65
End: 2025-05-02
Attending: NURSE PRACTITIONER
Payer: MEDICARE

## 2025-05-02 DIAGNOSIS — Z00.00 ANNUAL PHYSICAL EXAM: ICD-10-CM

## 2025-05-02 DIAGNOSIS — R79.89 INCREASED PTH LEVEL: ICD-10-CM

## 2025-05-02 DIAGNOSIS — Z13.1 SCREENING FOR DIABETES MELLITUS: ICD-10-CM

## 2025-05-02 DIAGNOSIS — M81.0 SENILE OSTEOPOROSIS: Primary | ICD-10-CM

## 2025-05-02 LAB
ALBUMIN SERPL-MCNC: 4.1 G/DL (ref 3.4–4.8)
ALBUMIN/GLOB SERPL: 1.2 RATIO (ref 1.1–2)
ALP SERPL-CCNC: 38 UNIT/L (ref 40–150)
ALT SERPL-CCNC: 17 UNIT/L (ref 0–55)
ANION GAP SERPL CALC-SCNC: 3 MEQ/L
AST SERPL-CCNC: 20 UNIT/L (ref 11–45)
BASOPHILS # BLD AUTO: 0.03 X10(3)/MCL
BASOPHILS NFR BLD AUTO: 0.7 %
BILIRUB DIRECT SERPL-MCNC: 0.3 MG/DL (ref 0–?)
BILIRUB SERPL-MCNC: 0.7 MG/DL
BUN SERPL-MCNC: 26.5 MG/DL (ref 9.8–20.1)
CALCIUM SERPL-MCNC: 9.6 MG/DL (ref 8.4–10.2)
CHLORIDE SERPL-SCNC: 106 MMOL/L (ref 98–107)
CHOLEST SERPL-MCNC: 176 MG/DL
CHOLEST/HDLC SERPL: 4 {RATIO} (ref 0–5)
CK SERPL-CCNC: 90 U/L (ref 29–168)
CO2 SERPL-SCNC: 31 MMOL/L (ref 23–31)
CREAT SERPL-MCNC: 0.93 MG/DL (ref 0.55–1.02)
CREAT/UREA NIT SERPL: 28
EOSINOPHIL # BLD AUTO: 0.2 X10(3)/MCL (ref 0–0.9)
EOSINOPHIL NFR BLD AUTO: 4.4 %
ERYTHROCYTE [DISTWIDTH] IN BLOOD BY AUTOMATED COUNT: 13.2 % (ref 11.5–17)
EST. AVERAGE GLUCOSE BLD GHB EST-MCNC: 99.7 MG/DL
GFR SERPLBLD CREATININE-BSD FMLA CKD-EPI: >60 ML/MIN/1.73/M2
GLOBULIN SER-MCNC: 3.3 GM/DL (ref 2.4–3.5)
GLUCOSE SERPL-MCNC: 97 MG/DL (ref 82–115)
HBA1C MFR BLD: 5.1 %
HCT VFR BLD AUTO: 44.2 % (ref 37–47)
HDLC SERPL-MCNC: 42 MG/DL (ref 35–60)
HGB BLD-MCNC: 14.7 G/DL (ref 12–16)
IMM GRANULOCYTES # BLD AUTO: 0.01 X10(3)/MCL (ref 0–0.04)
IMM GRANULOCYTES NFR BLD AUTO: 0.2 %
LDLC SERPL CALC-MCNC: 119 MG/DL (ref 50–140)
LYMPHOCYTES # BLD AUTO: 1.38 X10(3)/MCL (ref 0.6–4.6)
LYMPHOCYTES NFR BLD AUTO: 30.5 %
MCH RBC QN AUTO: 31.4 PG (ref 27–31)
MCHC RBC AUTO-ENTMCNC: 33.3 G/DL (ref 33–36)
MCV RBC AUTO: 94.4 FL (ref 80–94)
MONOCYTES # BLD AUTO: 0.53 X10(3)/MCL (ref 0.1–1.3)
MONOCYTES NFR BLD AUTO: 11.7 %
NEUTROPHILS # BLD AUTO: 2.38 X10(3)/MCL (ref 2.1–9.2)
NEUTROPHILS NFR BLD AUTO: 52.5 %
PHOSPHATE SERPL-MCNC: 3 MG/DL (ref 2.3–4.7)
PLATELET # BLD AUTO: 220 X10(3)/MCL (ref 130–400)
PMV BLD AUTO: 9.7 FL (ref 7.4–10.4)
POTASSIUM SERPL-SCNC: 4.9 MMOL/L (ref 3.5–5.1)
PROT SERPL-MCNC: 7.4 GM/DL (ref 5.8–7.6)
PTH-INTACT SERPL-MCNC: 53.4 PG/ML (ref 8.7–77)
RBC # BLD AUTO: 4.68 X10(6)/MCL (ref 4.2–5.4)
SODIUM SERPL-SCNC: 140 MMOL/L (ref 136–145)
TRIGL SERPL-MCNC: 76 MG/DL (ref 37–140)
TSH SERPL-ACNC: 1.6 UIU/ML (ref 0.35–4.94)
URATE SERPL-MCNC: 5.6 MG/DL (ref 2.6–6)
VLDLC SERPL CALC-MCNC: 15 MG/DL
WBC # BLD AUTO: 4.53 X10(3)/MCL (ref 4.5–11.5)

## 2025-05-02 PROCEDURE — 84550 ASSAY OF BLOOD/URIC ACID: CPT

## 2025-05-02 PROCEDURE — 80061 LIPID PANEL: CPT

## 2025-05-02 PROCEDURE — 84100 ASSAY OF PHOSPHORUS: CPT

## 2025-05-02 PROCEDURE — 36415 COLL VENOUS BLD VENIPUNCTURE: CPT

## 2025-05-02 PROCEDURE — 82550 ASSAY OF CK (CPK): CPT

## 2025-05-02 PROCEDURE — 83036 HEMOGLOBIN GLYCOSYLATED A1C: CPT

## 2025-05-02 PROCEDURE — 83970 ASSAY OF PARATHORMONE: CPT

## 2025-05-02 PROCEDURE — 80053 COMPREHEN METABOLIC PANEL: CPT

## 2025-05-02 PROCEDURE — 82248 BILIRUBIN DIRECT: CPT

## 2025-05-02 PROCEDURE — 84443 ASSAY THYROID STIM HORMONE: CPT

## 2025-05-02 PROCEDURE — 85025 COMPLETE CBC W/AUTO DIFF WBC: CPT

## 2025-05-06 ENCOUNTER — RESULTS FOLLOW-UP (OUTPATIENT)
Dept: FAMILY MEDICINE | Facility: CLINIC | Age: 65
End: 2025-05-06

## 2025-05-08 LAB — BMD RECOMMENDATION EXT: NORMAL

## 2025-05-12 ENCOUNTER — PATIENT MESSAGE (OUTPATIENT)
Dept: FAMILY MEDICINE | Facility: CLINIC | Age: 65
End: 2025-05-12
Payer: MEDICARE

## 2025-05-29 ENCOUNTER — TELEPHONE (OUTPATIENT)
Dept: FAMILY MEDICINE | Facility: CLINIC | Age: 65
End: 2025-05-29
Payer: MEDICARE

## 2025-06-05 ENCOUNTER — TELEPHONE (OUTPATIENT)
Dept: FAMILY MEDICINE | Facility: CLINIC | Age: 65
End: 2025-06-05

## 2025-06-05 ENCOUNTER — OFFICE VISIT (OUTPATIENT)
Dept: FAMILY MEDICINE | Facility: CLINIC | Age: 65
End: 2025-06-05
Payer: MEDICARE

## 2025-06-05 ENCOUNTER — RESULTS FOLLOW-UP (OUTPATIENT)
Dept: FAMILY MEDICINE | Facility: CLINIC | Age: 65
End: 2025-06-05

## 2025-06-05 VITALS
BODY MASS INDEX: 21.38 KG/M2 | RESPIRATION RATE: 16 BRPM | HEART RATE: 62 BPM | WEIGHT: 133 LBS | DIASTOLIC BLOOD PRESSURE: 80 MMHG | HEIGHT: 66 IN | SYSTOLIC BLOOD PRESSURE: 114 MMHG | OXYGEN SATURATION: 98 %

## 2025-06-05 DIAGNOSIS — N95.9 POST MENOPAUSAL PROBLEMS: ICD-10-CM

## 2025-06-05 DIAGNOSIS — R31.29 MICROSCOPIC HEMATURIA: Primary | ICD-10-CM

## 2025-06-05 DIAGNOSIS — N30.90 CYSTITIS: Primary | ICD-10-CM

## 2025-06-05 DIAGNOSIS — I10 HYPERTENSION, UNSPECIFIED TYPE: ICD-10-CM

## 2025-06-05 DIAGNOSIS — Z71.89 ADVANCED CARE PLANNING/COUNSELING DISCUSSION: ICD-10-CM

## 2025-06-05 DIAGNOSIS — T78.40XD ALLERGY, SUBSEQUENT ENCOUNTER: ICD-10-CM

## 2025-06-05 DIAGNOSIS — E78.2 MIXED HYPERLIPIDEMIA: ICD-10-CM

## 2025-06-05 DIAGNOSIS — I10 PRIMARY HYPERTENSION: ICD-10-CM

## 2025-06-05 DIAGNOSIS — M81.0 OSTEOPOROSIS, UNSPECIFIED OSTEOPOROSIS TYPE, UNSPECIFIED PATHOLOGICAL FRACTURE PRESENCE: ICD-10-CM

## 2025-06-05 DIAGNOSIS — Z12.31 BREAST CANCER SCREENING BY MAMMOGRAM: ICD-10-CM

## 2025-06-05 DIAGNOSIS — M15.0 PRIMARY OSTEOARTHRITIS INVOLVING MULTIPLE JOINTS: ICD-10-CM

## 2025-06-05 DIAGNOSIS — Z00.00 WELCOME TO MEDICARE PREVENTIVE VISIT: Primary | ICD-10-CM

## 2025-06-05 DIAGNOSIS — F32.A ANXIETY AND DEPRESSION: ICD-10-CM

## 2025-06-05 DIAGNOSIS — N18.2 STAGE 2 CHRONIC KIDNEY DISEASE: ICD-10-CM

## 2025-06-05 DIAGNOSIS — F41.9 ANXIETY AND DEPRESSION: ICD-10-CM

## 2025-06-05 PROBLEM — M13.0 POLYARTHRITIS: Status: ACTIVE | Noted: 2025-06-05

## 2025-06-05 LAB
BACTERIA #/AREA URNS AUTO: NORMAL /HPF
BILIRUB UR QL STRIP.AUTO: NEGATIVE
CLARITY UR: CLEAR
COLOR UR AUTO: ABNORMAL
CREAT UR-MCNC: 37.6 MG/DL (ref 45–106)
GLUCOSE UR QL STRIP: NEGATIVE
HGB UR QL STRIP: ABNORMAL
KETONES UR QL STRIP: NEGATIVE
LEUKOCYTE ESTERASE UR QL STRIP: ABNORMAL
NITRITE UR QL STRIP: NEGATIVE
PH UR STRIP: 5.5 [PH]
PROT UR QL STRIP: NEGATIVE
PROT UR STRIP-MCNC: <6.8 MG/DL
RBC #/AREA URNS AUTO: NORMAL /HPF
SP GR UR STRIP.AUTO: 1.01 (ref 1–1.03)
SQUAMOUS #/AREA URNS AUTO: NORMAL /HPF
UROBILINOGEN UR STRIP-ACNC: 0.2
WBC #/AREA URNS AUTO: NORMAL /HPF

## 2025-06-05 PROCEDURE — 81003 URINALYSIS AUTO W/O SCOPE: CPT | Performed by: NURSE PRACTITIONER

## 2025-06-05 PROCEDURE — 82570 ASSAY OF URINE CREATININE: CPT | Performed by: NURSE PRACTITIONER

## 2025-06-05 RX ORDER — PRAVASTATIN SODIUM 40 MG/1
40 TABLET ORAL
Qty: 90 TABLET | Refills: 1 | Status: SHIPPED | OUTPATIENT
Start: 2025-06-05

## 2025-06-05 RX ORDER — DICLOFENAC SODIUM 75 MG/1
75 TABLET, DELAYED RELEASE ORAL 2 TIMES DAILY PRN
COMMUNITY

## 2025-06-05 RX ORDER — MONTELUKAST SODIUM 10 MG/1
10 TABLET ORAL NIGHTLY
Qty: 90 TABLET | Refills: 2 | Status: SHIPPED | OUTPATIENT
Start: 2025-06-05

## 2025-06-05 RX ORDER — ALPRAZOLAM 0.25 MG/1
0.25 TABLET ORAL DAILY PRN
Qty: 30 TABLET | Refills: 2 | Status: SHIPPED | OUTPATIENT
Start: 2025-06-05

## 2025-06-05 RX ORDER — NITROFURANTOIN 25; 75 MG/1; MG/1
100 CAPSULE ORAL 2 TIMES DAILY
Qty: 14 CAPSULE | Refills: 0 | Status: SHIPPED | OUTPATIENT
Start: 2025-06-05 | End: 2025-06-12

## 2025-06-05 NOTE — PROGRESS NOTES
Urinalysis shows small amount of blood, small amount of leukocyte esterase but no bacteria or white blood cells to signify infection.  Protein is negative.  Will repeat a urinalysis in 6 months to see if she still has trace amounts of blood.

## 2025-06-18 ENCOUNTER — DOCUMENTATION ONLY (OUTPATIENT)
Dept: FAMILY MEDICINE | Facility: CLINIC | Age: 65
End: 2025-06-18
Payer: MEDICARE

## 2025-09-04 ENCOUNTER — DOCUMENTATION ONLY (OUTPATIENT)
Dept: FAMILY MEDICINE | Facility: CLINIC | Age: 65
End: 2025-09-04
Payer: MEDICARE

## 2025-09-04 LAB — BCS RECOMMENDATION EXT: NORMAL
